# Patient Record
Sex: MALE | Race: WHITE | NOT HISPANIC OR LATINO | Employment: OTHER | URBAN - METROPOLITAN AREA
[De-identification: names, ages, dates, MRNs, and addresses within clinical notes are randomized per-mention and may not be internally consistent; named-entity substitution may affect disease eponyms.]

---

## 2024-11-10 ENCOUNTER — APPOINTMENT (OUTPATIENT)
Dept: RADIOLOGY | Facility: HOSPITAL | Age: 79
DRG: 280 | End: 2024-11-10
Payer: MEDICARE

## 2024-11-10 ENCOUNTER — APPOINTMENT (OUTPATIENT)
Dept: CARDIOLOGY | Facility: HOSPITAL | Age: 79
DRG: 280 | End: 2024-11-10
Payer: MEDICARE

## 2024-11-10 ENCOUNTER — HOSPITAL ENCOUNTER (INPATIENT)
Facility: HOSPITAL | Age: 79
End: 2024-11-10
Attending: STUDENT IN AN ORGANIZED HEALTH CARE EDUCATION/TRAINING PROGRAM | Admitting: STUDENT IN AN ORGANIZED HEALTH CARE EDUCATION/TRAINING PROGRAM
Payer: MEDICARE

## 2024-11-10 VITALS
OXYGEN SATURATION: 95 % | TEMPERATURE: 96.4 F | HEART RATE: 81 BPM | WEIGHT: 155.75 LBS | DIASTOLIC BLOOD PRESSURE: 56 MMHG | SYSTOLIC BLOOD PRESSURE: 102 MMHG | RESPIRATION RATE: 20 BRPM | HEIGHT: 64 IN | BODY MASS INDEX: 26.59 KG/M2

## 2024-11-10 DIAGNOSIS — I50.23 ACUTE ON CHRONIC SYSTOLIC (CONGESTIVE) HEART FAILURE: ICD-10-CM

## 2024-11-10 DIAGNOSIS — I50.43 ACUTE ON CHRONIC COMBINED SYSTOLIC AND DIASTOLIC HEART FAILURE: ICD-10-CM

## 2024-11-10 DIAGNOSIS — R06.02 SOB (SHORTNESS OF BREATH): Primary | ICD-10-CM

## 2024-11-10 PROBLEM — N18.30 CKD STAGE 3 DUE TO TYPE 2 DIABETES MELLITUS (MULTI): Status: ACTIVE | Noted: 2021-01-06

## 2024-11-10 PROBLEM — T82.198A MALFUNCTION OF IMPLANTABLE DEFIBRILLATOR VENTRICULAR (ICD) LEAD: Status: ACTIVE | Noted: 2023-12-20

## 2024-11-10 PROBLEM — N40.0 BENIGN PROSTATIC HYPERPLASIA: Status: ACTIVE | Noted: 2019-08-16

## 2024-11-10 PROBLEM — J06.9 VIRAL UPPER RESPIRATORY TRACT INFECTION: Status: ACTIVE | Noted: 2024-11-10

## 2024-11-10 PROBLEM — R07.9 CHEST PAIN: Status: ACTIVE | Noted: 2019-08-15

## 2024-11-10 PROBLEM — I25.10 CAD (CORONARY ARTERY DISEASE): Status: ACTIVE | Noted: 2019-08-16

## 2024-11-10 PROBLEM — I34.0 NONRHEUMATIC MITRAL VALVE REGURGITATION: Status: ACTIVE | Noted: 2024-11-10

## 2024-11-10 PROBLEM — I50.9 CONGESTIVE HEART FAILURE: Status: ACTIVE | Noted: 2024-11-10

## 2024-11-10 PROBLEM — E11.9 CONTROLLED TYPE 2 DIABETES MELLITUS WITHOUT COMPLICATION, WITHOUT LONG-TERM CURRENT USE OF INSULIN (MULTI): Status: ACTIVE | Noted: 2022-11-02

## 2024-11-10 PROBLEM — E78.2 HYPERLIPEMIA, MIXED: Status: ACTIVE | Noted: 2024-11-10

## 2024-11-10 PROBLEM — E11.9 DIABETES MELLITUS (MULTI): Status: ACTIVE | Noted: 2024-11-10

## 2024-11-10 PROBLEM — I48.91 ATRIAL FIBRILLATION (MULTI): Status: ACTIVE | Noted: 2024-11-10

## 2024-11-10 PROBLEM — M10.9 GOUT: Status: ACTIVE | Noted: 2024-11-10

## 2024-11-10 PROBLEM — I50.42 CHRONIC COMBINED SYSTOLIC (CONGESTIVE) AND DIASTOLIC (CONGESTIVE) HEART FAILURE: Status: ACTIVE | Noted: 2020-10-28

## 2024-11-10 PROBLEM — E11.22 CKD STAGE 3 DUE TO TYPE 2 DIABETES MELLITUS (MULTI): Status: ACTIVE | Noted: 2021-01-06

## 2024-11-10 LAB
ALBUMIN SERPL BCP-MCNC: 4.1 G/DL (ref 3.4–5)
ALP SERPL-CCNC: 68 U/L (ref 33–136)
ALT SERPL W P-5'-P-CCNC: 11 U/L (ref 10–52)
ANION GAP BLDV CALCULATED.4IONS-SCNC: 15 MMOL/L (ref 10–25)
ANION GAP SERPL CALC-SCNC: 14 MMOL/L (ref 10–20)
ANION GAP SERPL CALC-SCNC: 15 MMOL/L (ref 10–20)
APTT PPP: 36 SECONDS (ref 27–38)
AST SERPL W P-5'-P-CCNC: 15 U/L (ref 9–39)
BASE EXCESS BLDV CALC-SCNC: -4.9 MMOL/L (ref -2–3)
BASOPHILS # BLD AUTO: 0.05 X10*3/UL (ref 0–0.1)
BASOPHILS NFR BLD AUTO: 0.5 %
BILIRUB SERPL-MCNC: 1 MG/DL (ref 0–1.2)
BNP SERPL-MCNC: 4296 PG/ML (ref 0–99)
BODY TEMPERATURE: 37 DEGREES CELSIUS
BUN SERPL-MCNC: 28 MG/DL (ref 6–23)
BUN SERPL-MCNC: 30 MG/DL (ref 6–23)
CA-I BLDV-SCNC: 1.16 MMOL/L (ref 1.1–1.33)
CALCIUM SERPL-MCNC: 8.6 MG/DL (ref 8.6–10.3)
CALCIUM SERPL-MCNC: 8.8 MG/DL (ref 8.6–10.3)
CARDIAC TROPONIN I PNL SERPL HS: 27 NG/L (ref 0–20)
CARDIAC TROPONIN I PNL SERPL HS: 29 NG/L (ref 0–20)
CHLORIDE BLDV-SCNC: 109 MMOL/L (ref 98–107)
CHLORIDE SERPL-SCNC: 107 MMOL/L (ref 98–107)
CHLORIDE SERPL-SCNC: 108 MMOL/L (ref 98–107)
CK SERPL-CCNC: 57 U/L (ref 0–325)
CO2 SERPL-SCNC: 21 MMOL/L (ref 21–32)
CO2 SERPL-SCNC: 23 MMOL/L (ref 21–32)
CREAT SERPL-MCNC: 1.4 MG/DL (ref 0.5–1.3)
CREAT SERPL-MCNC: 1.42 MG/DL (ref 0.5–1.3)
EGFRCR SERPLBLD CKD-EPI 2021: 50 ML/MIN/1.73M*2
EGFRCR SERPLBLD CKD-EPI 2021: 51 ML/MIN/1.73M*2
EOSINOPHIL # BLD AUTO: 0.49 X10*3/UL (ref 0–0.4)
EOSINOPHIL NFR BLD AUTO: 5.2 %
ERYTHROCYTE [DISTWIDTH] IN BLOOD BY AUTOMATED COUNT: 13.3 % (ref 11.5–14.5)
FLUAV RNA RESP QL NAA+PROBE: NOT DETECTED
FLUBV RNA RESP QL NAA+PROBE: NOT DETECTED
GLUCOSE BLD MANUAL STRIP-MCNC: 184 MG/DL (ref 74–99)
GLUCOSE BLD MANUAL STRIP-MCNC: 231 MG/DL (ref 74–99)
GLUCOSE BLD MANUAL STRIP-MCNC: 239 MG/DL (ref 74–99)
GLUCOSE BLD MANUAL STRIP-MCNC: 274 MG/DL (ref 74–99)
GLUCOSE BLDV-MCNC: 271 MG/DL (ref 74–99)
GLUCOSE SERPL-MCNC: 247 MG/DL (ref 74–99)
GLUCOSE SERPL-MCNC: 287 MG/DL (ref 74–99)
HCO3 BLDV-SCNC: 20.6 MMOL/L (ref 22–26)
HCT VFR BLD AUTO: 34.8 % (ref 41–52)
HCT VFR BLD EST: 36 % (ref 41–52)
HGB BLD-MCNC: 11.9 G/DL (ref 13.5–17.5)
HGB BLDV-MCNC: 12 G/DL (ref 13.5–17.5)
IMM GRANULOCYTES # BLD AUTO: 0.05 X10*3/UL (ref 0–0.5)
IMM GRANULOCYTES NFR BLD AUTO: 0.5 % (ref 0–0.9)
INHALED O2 CONCENTRATION: 36 %
INR PPP: 2.8 (ref 0.9–1.1)
LACTATE BLDV-SCNC: 3.1 MMOL/L (ref 0.4–2)
LYMPHOCYTES # BLD AUTO: 2.49 X10*3/UL (ref 0.8–3)
LYMPHOCYTES NFR BLD AUTO: 26.4 %
MAGNESIUM SERPL-MCNC: 2.2 MG/DL (ref 1.6–2.4)
MCH RBC QN AUTO: 34.1 PG (ref 26–34)
MCHC RBC AUTO-ENTMCNC: 34.2 G/DL (ref 32–36)
MCV RBC AUTO: 100 FL (ref 80–100)
MONOCYTES # BLD AUTO: 0.58 X10*3/UL (ref 0.05–0.8)
MONOCYTES NFR BLD AUTO: 6.1 %
NEUTROPHILS # BLD AUTO: 5.78 X10*3/UL (ref 1.6–5.5)
NEUTROPHILS NFR BLD AUTO: 61.3 %
NRBC BLD-RTO: 0 /100 WBCS (ref 0–0)
OXYHGB MFR BLDV: 88.6 % (ref 45–75)
PCO2 BLDV: 39 MM HG (ref 41–51)
PH BLDV: 7.33 PH (ref 7.33–7.43)
PLATELET # BLD AUTO: 184 X10*3/UL (ref 150–450)
PO2 BLDV: 61 MM HG (ref 35–45)
POTASSIUM BLDV-SCNC: 5.1 MMOL/L (ref 3.5–5.3)
POTASSIUM SERPL-SCNC: 3.8 MMOL/L (ref 3.5–5.3)
POTASSIUM SERPL-SCNC: 5.1 MMOL/L (ref 3.5–5.3)
PROT SERPL-MCNC: 7.2 G/DL (ref 6.4–8.2)
PROTHROMBIN TIME: 31.7 SECONDS (ref 9.8–12.8)
RBC # BLD AUTO: 3.49 X10*6/UL (ref 4.5–5.9)
RSV RNA RESP QL NAA+PROBE: NOT DETECTED
SAO2 % BLDV: 90 % (ref 45–75)
SARS-COV-2 RNA RESP QL NAA+PROBE: NOT DETECTED
SODIUM BLDV-SCNC: 139 MMOL/L (ref 136–145)
SODIUM SERPL-SCNC: 139 MMOL/L (ref 136–145)
SODIUM SERPL-SCNC: 140 MMOL/L (ref 136–145)
WBC # BLD AUTO: 9.4 X10*3/UL (ref 4.4–11.3)

## 2024-11-10 PROCEDURE — 2500000002 HC RX 250 W HCPCS SELF ADMINISTERED DRUGS (ALT 637 FOR MEDICARE OP, ALT 636 FOR OP/ED)

## 2024-11-10 PROCEDURE — 2500000001 HC RX 250 WO HCPCS SELF ADMINISTERED DRUGS (ALT 637 FOR MEDICARE OP): Performed by: STUDENT IN AN ORGANIZED HEALTH CARE EDUCATION/TRAINING PROGRAM

## 2024-11-10 PROCEDURE — 71045 X-RAY EXAM CHEST 1 VIEW: CPT | Performed by: RADIOLOGY

## 2024-11-10 PROCEDURE — 85610 PROTHROMBIN TIME: CPT | Performed by: NURSE PRACTITIONER

## 2024-11-10 PROCEDURE — 83735 ASSAY OF MAGNESIUM: CPT | Performed by: NURSE PRACTITIONER

## 2024-11-10 PROCEDURE — 99223 1ST HOSP IP/OBS HIGH 75: CPT | Performed by: PSYCHIATRY & NEUROLOGY

## 2024-11-10 PROCEDURE — 83036 HEMOGLOBIN GLYCOSYLATED A1C: CPT | Performed by: PSYCHIATRY & NEUROLOGY

## 2024-11-10 PROCEDURE — 2500000002 HC RX 250 W HCPCS SELF ADMINISTERED DRUGS (ALT 637 FOR MEDICARE OP, ALT 636 FOR OP/ED): Performed by: NURSE PRACTITIONER

## 2024-11-10 PROCEDURE — 96375 TX/PRO/DX INJ NEW DRUG ADDON: CPT

## 2024-11-10 PROCEDURE — 85025 COMPLETE CBC W/AUTO DIFF WBC: CPT | Performed by: NURSE PRACTITIONER

## 2024-11-10 PROCEDURE — 87634 RSV DNA/RNA AMP PROBE: CPT | Performed by: NURSE PRACTITIONER

## 2024-11-10 PROCEDURE — 1200000002 HC GENERAL ROOM WITH TELEMETRY DAILY

## 2024-11-10 PROCEDURE — 87899 AGENT NOS ASSAY W/OPTIC: CPT | Mod: PARLAB | Performed by: STUDENT IN AN ORGANIZED HEALTH CARE EDUCATION/TRAINING PROGRAM

## 2024-11-10 PROCEDURE — 84132 ASSAY OF SERUM POTASSIUM: CPT | Performed by: NURSE PRACTITIONER

## 2024-11-10 PROCEDURE — 80053 COMPREHEN METABOLIC PANEL: CPT | Performed by: NURSE PRACTITIONER

## 2024-11-10 PROCEDURE — 2500000005 HC RX 250 GENERAL PHARMACY W/O HCPCS: Performed by: STUDENT IN AN ORGANIZED HEALTH CARE EDUCATION/TRAINING PROGRAM

## 2024-11-10 PROCEDURE — 2500000002 HC RX 250 W HCPCS SELF ADMINISTERED DRUGS (ALT 637 FOR MEDICARE OP, ALT 636 FOR OP/ED): Performed by: INTERNAL MEDICINE

## 2024-11-10 PROCEDURE — 2500000002 HC RX 250 W HCPCS SELF ADMINISTERED DRUGS (ALT 637 FOR MEDICARE OP, ALT 636 FOR OP/ED): Performed by: STUDENT IN AN ORGANIZED HEALTH CARE EDUCATION/TRAINING PROGRAM

## 2024-11-10 PROCEDURE — 84145 PROCALCITONIN (PCT): CPT | Mod: PARLAB | Performed by: STUDENT IN AN ORGANIZED HEALTH CARE EDUCATION/TRAINING PROGRAM

## 2024-11-10 PROCEDURE — 94640 AIRWAY INHALATION TREATMENT: CPT

## 2024-11-10 PROCEDURE — 87636 SARSCOV2 & INF A&B AMP PRB: CPT | Performed by: NURSE PRACTITIONER

## 2024-11-10 PROCEDURE — 84484 ASSAY OF TROPONIN QUANT: CPT | Performed by: NURSE PRACTITIONER

## 2024-11-10 PROCEDURE — 93005 ELECTROCARDIOGRAM TRACING: CPT

## 2024-11-10 PROCEDURE — 82947 ASSAY GLUCOSE BLOOD QUANT: CPT

## 2024-11-10 PROCEDURE — 36415 COLL VENOUS BLD VENIPUNCTURE: CPT | Performed by: NURSE PRACTITIONER

## 2024-11-10 PROCEDURE — 80048 BASIC METABOLIC PNL TOTAL CA: CPT | Mod: CCI | Performed by: STUDENT IN AN ORGANIZED HEALTH CARE EDUCATION/TRAINING PROGRAM

## 2024-11-10 PROCEDURE — 96374 THER/PROPH/DIAG INJ IV PUSH: CPT

## 2024-11-10 PROCEDURE — 82550 ASSAY OF CK (CPK): CPT | Performed by: PSYCHIATRY & NEUROLOGY

## 2024-11-10 PROCEDURE — 83519 RIA NONANTIBODY: CPT | Performed by: PSYCHIATRY & NEUROLOGY

## 2024-11-10 PROCEDURE — 99223 1ST HOSP IP/OBS HIGH 75: CPT | Performed by: STUDENT IN AN ORGANIZED HEALTH CARE EDUCATION/TRAINING PROGRAM

## 2024-11-10 PROCEDURE — 87449 NOS EACH ORGANISM AG IA: CPT | Mod: PARLAB | Performed by: STUDENT IN AN ORGANIZED HEALTH CARE EDUCATION/TRAINING PROGRAM

## 2024-11-10 PROCEDURE — 70450 CT HEAD/BRAIN W/O DYE: CPT | Performed by: RADIOLOGY

## 2024-11-10 PROCEDURE — 83880 ASSAY OF NATRIURETIC PEPTIDE: CPT | Performed by: NURSE PRACTITIONER

## 2024-11-10 PROCEDURE — 2500000004 HC RX 250 GENERAL PHARMACY W/ HCPCS (ALT 636 FOR OP/ED): Performed by: NURSE PRACTITIONER

## 2024-11-10 PROCEDURE — 86738 MYCOPLASMA ANTIBODY: CPT | Performed by: STUDENT IN AN ORGANIZED HEALTH CARE EDUCATION/TRAINING PROGRAM

## 2024-11-10 PROCEDURE — 2500000004 HC RX 250 GENERAL PHARMACY W/ HCPCS (ALT 636 FOR OP/ED): Performed by: STUDENT IN AN ORGANIZED HEALTH CARE EDUCATION/TRAINING PROGRAM

## 2024-11-10 PROCEDURE — 36415 COLL VENOUS BLD VENIPUNCTURE: CPT | Performed by: STUDENT IN AN ORGANIZED HEALTH CARE EDUCATION/TRAINING PROGRAM

## 2024-11-10 PROCEDURE — 99285 EMERGENCY DEPT VISIT HI MDM: CPT | Mod: 25

## 2024-11-10 PROCEDURE — 71045 X-RAY EXAM CHEST 1 VIEW: CPT

## 2024-11-10 PROCEDURE — 70450 CT HEAD/BRAIN W/O DYE: CPT

## 2024-11-10 RX ORDER — OMEPRAZOLE 20 MG/1
20 CAPSULE, DELAYED RELEASE ORAL
Status: ON HOLD | COMMUNITY

## 2024-11-10 RX ORDER — TORSEMIDE 5 MG/1
5 TABLET ORAL DAILY
Status: ON HOLD | COMMUNITY

## 2024-11-10 RX ORDER — CARVEDILOL 6.25 MG/1
6.25 TABLET ORAL 2 TIMES DAILY
Status: ON HOLD | COMMUNITY

## 2024-11-10 RX ORDER — CEFTRIAXONE 1 G/50ML
1 INJECTION, SOLUTION INTRAVENOUS EVERY 24 HOURS
Status: DISPENSED | OUTPATIENT
Start: 2024-11-10

## 2024-11-10 RX ORDER — ASPIRIN 81 MG/1
81 TABLET ORAL 3 TIMES WEEKLY
Status: ACTIVE | OUTPATIENT
Start: 2024-11-11

## 2024-11-10 RX ORDER — LEVETIRACETAM 500 MG/1
750 TABLET ORAL NIGHTLY
Status: DISPENSED | OUTPATIENT
Start: 2024-11-10

## 2024-11-10 RX ORDER — WARFARIN 7.5 MG/1
3.75 TABLET ORAL
Status: ACTIVE | OUTPATIENT
Start: 2024-11-11

## 2024-11-10 RX ORDER — LEVETIRACETAM 500 MG/1
500 TABLET ORAL EVERY MORNING
Status: ON HOLD | COMMUNITY

## 2024-11-10 RX ORDER — POTASSIUM CHLORIDE 20 MEQ/1
20 TABLET, EXTENDED RELEASE ORAL ONCE
Status: COMPLETED | OUTPATIENT
Start: 2024-11-10 | End: 2024-11-10

## 2024-11-10 RX ORDER — EPLERENONE 25 MG/1
25 TABLET, FILM COATED ORAL DAILY
Status: DISPENSED | OUTPATIENT
Start: 2024-11-10

## 2024-11-10 RX ORDER — LEVETIRACETAM 500 MG/1
500 TABLET ORAL EVERY MORNING
Status: DISPENSED | OUTPATIENT
Start: 2024-11-10

## 2024-11-10 RX ORDER — ACETAMINOPHEN 325 MG/1
650 TABLET ORAL EVERY 6 HOURS PRN
Status: ACTIVE | OUTPATIENT
Start: 2024-11-10

## 2024-11-10 RX ORDER — EPLERENONE 25 MG/1
25 TABLET, FILM COATED ORAL DAILY
Status: ON HOLD | COMMUNITY

## 2024-11-10 RX ORDER — WARFARIN 2.5 MG/1
2.5 TABLET ORAL
Status: DISPENSED | OUTPATIENT
Start: 2024-11-10

## 2024-11-10 RX ORDER — SIMVASTATIN 40 MG/1
40 TABLET, FILM COATED ORAL NIGHTLY
Status: ON HOLD | COMMUNITY

## 2024-11-10 RX ORDER — FINASTERIDE 5 MG/1
5 TABLET, FILM COATED ORAL NIGHTLY
Status: ON HOLD | COMMUNITY

## 2024-11-10 RX ORDER — IPRATROPIUM BROMIDE AND ALBUTEROL SULFATE 2.5; .5 MG/3ML; MG/3ML
3 SOLUTION RESPIRATORY (INHALATION) EVERY 2 HOUR PRN
Status: ACTIVE | OUTPATIENT
Start: 2024-11-10

## 2024-11-10 RX ORDER — LEVETIRACETAM 750 MG/1
750 TABLET ORAL NIGHTLY
Status: ON HOLD | COMMUNITY

## 2024-11-10 RX ORDER — DULAGLUTIDE 1.5 MG/.5ML
1.5 INJECTION, SOLUTION SUBCUTANEOUS WEEKLY
Status: ON HOLD | COMMUNITY

## 2024-11-10 RX ORDER — ASPIRIN 81 MG/1
81 TABLET ORAL 3 TIMES WEEKLY
Status: ON HOLD | COMMUNITY

## 2024-11-10 RX ORDER — FINASTERIDE 5 MG/1
5 TABLET, FILM COATED ORAL NIGHTLY
Status: DISPENSED | OUTPATIENT
Start: 2024-11-10

## 2024-11-10 RX ORDER — PANTOPRAZOLE SODIUM 40 MG/1
40 TABLET, DELAYED RELEASE ORAL DAILY
Status: DISPENSED | OUTPATIENT
Start: 2024-11-10

## 2024-11-10 RX ORDER — FUROSEMIDE 10 MG/ML
20 INJECTION INTRAMUSCULAR; INTRAVENOUS EVERY 12 HOURS
Status: DISPENSED | OUTPATIENT
Start: 2024-11-10

## 2024-11-10 RX ORDER — POLYETHYLENE GLYCOL 3350 17 G/17G
17 POWDER, FOR SOLUTION ORAL DAILY PRN
Status: ACTIVE | OUTPATIENT
Start: 2024-11-10

## 2024-11-10 RX ORDER — FUROSEMIDE 10 MG/ML
40 INJECTION INTRAMUSCULAR; INTRAVENOUS ONCE
Status: COMPLETED | OUTPATIENT
Start: 2024-11-10 | End: 2024-11-10

## 2024-11-10 RX ORDER — DEXTROMETHORPHAN HYDROBROMIDE, GUAIFENESIN 5; 100 MG/5ML; MG/5ML
650 LIQUID ORAL EVERY 4 HOURS PRN
Status: ON HOLD | COMMUNITY

## 2024-11-10 RX ORDER — ALBUTEROL SULFATE 0.83 MG/ML
2.5 SOLUTION RESPIRATORY (INHALATION) ONCE
Status: COMPLETED | OUTPATIENT
Start: 2024-11-10 | End: 2024-11-10

## 2024-11-10 RX ORDER — SIMVASTATIN 20 MG/1
40 TABLET, FILM COATED ORAL NIGHTLY
Status: DISPENSED | OUTPATIENT
Start: 2024-11-10

## 2024-11-10 RX ORDER — DIGOXIN 125 MCG
125 TABLET ORAL
Status: DISPENSED | OUTPATIENT
Start: 2024-11-10

## 2024-11-10 RX ORDER — SACUBITRIL AND VALSARTAN 24; 26 MG/1; MG/1
1 TABLET, FILM COATED ORAL 2 TIMES DAILY
Status: ON HOLD | COMMUNITY

## 2024-11-10 RX ORDER — DIGOXIN 125 MCG
125 TABLET ORAL
Status: ON HOLD | COMMUNITY

## 2024-11-10 RX ORDER — WARFARIN 2.5 MG/1
2.5 TABLET ORAL
Status: DISCONTINUED | OUTPATIENT
Start: 2024-11-11 | End: 2024-11-10

## 2024-11-10 RX ORDER — WARFARIN 7.5 MG/1
3.75 TABLET ORAL
Status: DISCONTINUED | OUTPATIENT
Start: 2024-11-10 | End: 2024-11-10

## 2024-11-10 RX ORDER — IPRATROPIUM BROMIDE AND ALBUTEROL SULFATE 2.5; .5 MG/3ML; MG/3ML
3 SOLUTION RESPIRATORY (INHALATION) ONCE
Status: COMPLETED | OUTPATIENT
Start: 2024-11-10 | End: 2024-11-10

## 2024-11-10 RX ORDER — CARVEDILOL 3.12 MG/1
6.25 TABLET ORAL 2 TIMES DAILY
Status: DISPENSED | OUTPATIENT
Start: 2024-11-10

## 2024-11-10 RX ORDER — INSULIN LISPRO 100 [IU]/ML
0-10 INJECTION, SOLUTION INTRAVENOUS; SUBCUTANEOUS
Status: DISPENSED | OUTPATIENT
Start: 2024-11-10

## 2024-11-10 RX ORDER — WARFARIN 2.5 MG/1
2.5 TABLET ORAL DAILY
Status: ON HOLD | COMMUNITY

## 2024-11-10 RX ADMIN — DIGOXIN 125 MCG: 125 TABLET ORAL at 09:47

## 2024-11-10 RX ADMIN — LEVETIRACETAM 750 MG: 500 TABLET, FILM COATED ORAL at 22:26

## 2024-11-10 RX ADMIN — POTASSIUM CHLORIDE 20 MEQ: 1500 TABLET, EXTENDED RELEASE ORAL at 12:56

## 2024-11-10 RX ADMIN — Medication 2 L/MIN: at 07:42

## 2024-11-10 RX ADMIN — ALBUTEROL SULFATE 2.5 MG: 2.5 SOLUTION RESPIRATORY (INHALATION) at 01:07

## 2024-11-10 RX ADMIN — FUROSEMIDE 40 MG: 10 INJECTION, SOLUTION INTRAMUSCULAR; INTRAVENOUS at 02:13

## 2024-11-10 RX ADMIN — INSULIN LISPRO 2 UNITS: 100 INJECTION, SOLUTION INTRAVENOUS; SUBCUTANEOUS at 17:50

## 2024-11-10 RX ADMIN — PANTOPRAZOLE SODIUM 40 MG: 40 TABLET, DELAYED RELEASE ORAL at 09:49

## 2024-11-10 RX ADMIN — FINASTERIDE 5 MG: 5 TABLET, FILM COATED ORAL at 22:26

## 2024-11-10 RX ADMIN — SIMVASTATIN 40 MG: 20 TABLET, FILM COATED ORAL at 22:26

## 2024-11-10 RX ADMIN — CARVEDILOL 6.25 MG: 3.12 TABLET, FILM COATED ORAL at 09:48

## 2024-11-10 RX ADMIN — LEVETIRACETAM 500 MG: 500 TABLET, FILM COATED ORAL at 09:47

## 2024-11-10 RX ADMIN — METHYLPREDNISOLONE SODIUM SUCCINATE 125 MG: 125 INJECTION, POWDER, FOR SOLUTION INTRAMUSCULAR; INTRAVENOUS at 01:20

## 2024-11-10 RX ADMIN — INSULIN LISPRO 6 UNITS: 100 INJECTION, SOLUTION INTRAVENOUS; SUBCUTANEOUS at 12:56

## 2024-11-10 RX ADMIN — EPLERENONE 25 MG: 25 TABLET, FILM COATED ORAL at 09:48

## 2024-11-10 RX ADMIN — SACUBITRIL AND VALSARTAN 1 TABLET: 24; 26 TABLET, FILM COATED ORAL at 22:26

## 2024-11-10 RX ADMIN — FUROSEMIDE 20 MG: 10 INJECTION, SOLUTION INTRAMUSCULAR; INTRAVENOUS at 09:47

## 2024-11-10 RX ADMIN — DOXYCYCLINE 100 MG: 100 INJECTION, POWDER, LYOPHILIZED, FOR SOLUTION INTRAVENOUS at 06:29

## 2024-11-10 RX ADMIN — WARFARIN SODIUM 2.5 MG: 2.5 TABLET ORAL at 18:03

## 2024-11-10 RX ADMIN — FUROSEMIDE 20 MG: 10 INJECTION, SOLUTION INTRAMUSCULAR; INTRAVENOUS at 22:26

## 2024-11-10 RX ADMIN — SACUBITRIL AND VALSARTAN 1 TABLET: 24; 26 TABLET, FILM COATED ORAL at 09:47

## 2024-11-10 RX ADMIN — CARVEDILOL 6.25 MG: 3.12 TABLET, FILM COATED ORAL at 22:26

## 2024-11-10 RX ADMIN — CEFTRIAXONE SODIUM 1 G: 1 INJECTION, SOLUTION INTRAVENOUS at 07:57

## 2024-11-10 RX ADMIN — DOXYCYCLINE 100 MG: 100 INJECTION, POWDER, LYOPHILIZED, FOR SOLUTION INTRAVENOUS at 17:59

## 2024-11-10 RX ADMIN — INSULIN LISPRO 4 UNITS: 100 INJECTION, SOLUTION INTRAVENOUS; SUBCUTANEOUS at 07:55

## 2024-11-10 RX ADMIN — Medication 2 L/MIN: at 22:28

## 2024-11-10 RX ADMIN — IPRATROPIUM BROMIDE AND ALBUTEROL SULFATE 3 ML: .5; 3 SOLUTION RESPIRATORY (INHALATION) at 01:07

## 2024-11-10 SDOH — ECONOMIC STABILITY: FOOD INSECURITY: HOW HARD IS IT FOR YOU TO PAY FOR THE VERY BASICS LIKE FOOD, HOUSING, MEDICAL CARE, AND HEATING?: NOT VERY HARD

## 2024-11-10 SDOH — ECONOMIC STABILITY: HOUSING INSECURITY: IN THE LAST 12 MONTHS, WAS THERE A TIME WHEN YOU WERE NOT ABLE TO PAY THE MORTGAGE OR RENT ON TIME?: NO

## 2024-11-10 SDOH — SOCIAL STABILITY: SOCIAL INSECURITY
WITHIN THE LAST YEAR, HAVE YOU BEEN KICKED, HIT, SLAPPED, OR OTHERWISE PHYSICALLY HURT BY YOUR PARTNER OR EX-PARTNER?: NO

## 2024-11-10 SDOH — ECONOMIC STABILITY: FOOD INSECURITY: WITHIN THE PAST 12 MONTHS, YOU WORRIED THAT YOUR FOOD WOULD RUN OUT BEFORE YOU GOT THE MONEY TO BUY MORE.: NEVER TRUE

## 2024-11-10 SDOH — SOCIAL STABILITY: SOCIAL INSECURITY: WITHIN THE LAST YEAR, HAVE YOU BEEN HUMILIATED OR EMOTIONALLY ABUSED IN OTHER WAYS BY YOUR PARTNER OR EX-PARTNER?: NO

## 2024-11-10 SDOH — SOCIAL STABILITY: SOCIAL INSECURITY: WITHIN THE LAST YEAR, HAVE YOU BEEN AFRAID OF YOUR PARTNER OR EX-PARTNER?: NO

## 2024-11-10 SDOH — SOCIAL STABILITY: SOCIAL INSECURITY: DO YOU FEEL ANYONE HAS EXPLOITED OR TAKEN ADVANTAGE OF YOU FINANCIALLY OR OF YOUR PERSONAL PROPERTY?: NO

## 2024-11-10 SDOH — ECONOMIC STABILITY: HOUSING INSECURITY: IN THE PAST 12 MONTHS, HOW MANY TIMES HAVE YOU MOVED WHERE YOU WERE LIVING?: 0

## 2024-11-10 SDOH — SOCIAL STABILITY: SOCIAL INSECURITY
WITHIN THE LAST YEAR, HAVE YOU BEEN RAPED OR FORCED TO HAVE ANY KIND OF SEXUAL ACTIVITY BY YOUR PARTNER OR EX-PARTNER?: NO

## 2024-11-10 SDOH — SOCIAL STABILITY: SOCIAL INSECURITY: DOES ANYONE TRY TO KEEP YOU FROM HAVING/CONTACTING OTHER FRIENDS OR DOING THINGS OUTSIDE YOUR HOME?: NO

## 2024-11-10 SDOH — ECONOMIC STABILITY: FOOD INSECURITY: WITHIN THE PAST 12 MONTHS, THE FOOD YOU BOUGHT JUST DIDN'T LAST AND YOU DIDN'T HAVE MONEY TO GET MORE.: NEVER TRUE

## 2024-11-10 SDOH — SOCIAL STABILITY: SOCIAL INSECURITY: DO YOU FEEL UNSAFE GOING BACK TO THE PLACE WHERE YOU ARE LIVING?: NO

## 2024-11-10 SDOH — SOCIAL STABILITY: SOCIAL INSECURITY: ABUSE: ADULT

## 2024-11-10 SDOH — ECONOMIC STABILITY: TRANSPORTATION INSECURITY: IN THE PAST 12 MONTHS, HAS LACK OF TRANSPORTATION KEPT YOU FROM MEDICAL APPOINTMENTS OR FROM GETTING MEDICATIONS?: NO

## 2024-11-10 SDOH — SOCIAL STABILITY: SOCIAL INSECURITY: ARE YOU OR HAVE YOU BEEN THREATENED OR ABUSED PHYSICALLY, EMOTIONALLY, OR SEXUALLY BY ANYONE?: NO

## 2024-11-10 SDOH — SOCIAL STABILITY: SOCIAL INSECURITY: HAVE YOU HAD ANY THOUGHTS OF HARMING ANYONE ELSE?: NO

## 2024-11-10 SDOH — SOCIAL STABILITY: SOCIAL INSECURITY: HAS ANYONE EVER THREATENED TO HURT YOUR FAMILY OR YOUR PETS?: NO

## 2024-11-10 SDOH — ECONOMIC STABILITY: HOUSING INSECURITY: AT ANY TIME IN THE PAST 12 MONTHS, WERE YOU HOMELESS OR LIVING IN A SHELTER (INCLUDING NOW)?: NO

## 2024-11-10 SDOH — SOCIAL STABILITY: SOCIAL INSECURITY: ARE THERE ANY APPARENT SIGNS OF INJURIES/BEHAVIORS THAT COULD BE RELATED TO ABUSE/NEGLECT?: NO

## 2024-11-10 SDOH — ECONOMIC STABILITY: INCOME INSECURITY: IN THE PAST 12 MONTHS HAS THE ELECTRIC, GAS, OIL, OR WATER COMPANY THREATENED TO SHUT OFF SERVICES IN YOUR HOME?: NO

## 2024-11-10 SDOH — SOCIAL STABILITY: SOCIAL INSECURITY: HAVE YOU HAD THOUGHTS OF HARMING ANYONE ELSE?: NO

## 2024-11-10 ASSESSMENT — ACTIVITIES OF DAILY LIVING (ADL)
FEEDING YOURSELF: INDEPENDENT
LACK_OF_TRANSPORTATION: NO
JUDGMENT_ADEQUATE_SAFELY_COMPLETE_DAILY_ACTIVITIES: YES
HEARING - LEFT EAR: FUNCTIONAL
BATHING: NEEDS ASSISTANCE
GROOMING: INDEPENDENT
ADEQUATE_TO_COMPLETE_ADL: YES
HEARING - RIGHT EAR: FUNCTIONAL
PATIENT'S MEMORY ADEQUATE TO SAFELY COMPLETE DAILY ACTIVITIES?: YES
TOILETING: NEEDS ASSISTANCE
DRESSING YOURSELF: INDEPENDENT
WALKS IN HOME: INDEPENDENT
LACK_OF_TRANSPORTATION: NO

## 2024-11-10 ASSESSMENT — COGNITIVE AND FUNCTIONAL STATUS - GENERAL
HELP NEEDED FOR BATHING: A LITTLE
CLIMB 3 TO 5 STEPS WITH RAILING: A LITTLE
MOBILITY SCORE: 24
MOVING TO AND FROM BED TO CHAIR: A LITTLE
STANDING UP FROM CHAIR USING ARMS: A LITTLE
DAILY ACTIVITIY SCORE: 20
DRESSING REGULAR LOWER BODY CLOTHING: A LITTLE
MOBILITY SCORE: 22
PATIENT BASELINE BEDBOUND: NO
WALKING IN HOSPITAL ROOM: A LITTLE
TOILETING: A LITTLE
MOBILITY SCORE: 20
TOILETING: A LITTLE
HELP NEEDED FOR BATHING: A LITTLE
PERSONAL GROOMING: A LITTLE
CLIMB 3 TO 5 STEPS WITH RAILING: A LITTLE
DAILY ACTIVITIY SCORE: 24
DAILY ACTIVITIY SCORE: 22
WALKING IN HOSPITAL ROOM: A LITTLE

## 2024-11-10 ASSESSMENT — LIFESTYLE VARIABLES
HOW OFTEN DO YOU HAVE 6 OR MORE DRINKS ON ONE OCCASION: NEVER
AUDIT-C TOTAL SCORE: 0
EVER FELT BAD OR GUILTY ABOUT YOUR DRINKING: NO
SKIP TO QUESTIONS 9-10: 1
TOTAL SCORE: 0
HAVE PEOPLE ANNOYED YOU BY CRITICIZING YOUR DRINKING: NO
EVER HAD A DRINK FIRST THING IN THE MORNING TO STEADY YOUR NERVES TO GET RID OF A HANGOVER: NO
AUDIT-C TOTAL SCORE: 0
HAVE YOU EVER FELT YOU SHOULD CUT DOWN ON YOUR DRINKING: NO
HOW OFTEN DO YOU HAVE A DRINK CONTAINING ALCOHOL: NEVER
HOW MANY STANDARD DRINKS CONTAINING ALCOHOL DO YOU HAVE ON A TYPICAL DAY: PATIENT DOES NOT DRINK

## 2024-11-10 ASSESSMENT — ENCOUNTER SYMPTOMS
SHORTNESS OF BREATH: 1
COUGH: 1

## 2024-11-10 ASSESSMENT — PAIN SCALES - GENERAL
PAINLEVEL_OUTOF10: 0 - NO PAIN

## 2024-11-10 ASSESSMENT — PAIN DESCRIPTION - PROGRESSION: CLINICAL_PROGRESSION: GRADUALLY IMPROVING

## 2024-11-10 ASSESSMENT — PAIN - FUNCTIONAL ASSESSMENT: PAIN_FUNCTIONAL_ASSESSMENT: 0-10

## 2024-11-10 ASSESSMENT — PATIENT HEALTH QUESTIONNAIRE - PHQ9
SUM OF ALL RESPONSES TO PHQ9 QUESTIONS 1 & 2: 0
2. FEELING DOWN, DEPRESSED OR HOPELESS: NOT AT ALL
1. LITTLE INTEREST OR PLEASURE IN DOING THINGS: NOT AT ALL

## 2024-11-10 NOTE — H&P
Subjective   Ernst Clark is a 79 y.o. male who presents for No chief complaint on file.    HPI  This is an 79-year-old male who initially presented to [facility] on 11/10/2024 with sudden onset SOB beginning at approximately 10:30PM the night prior.  He reports sitting in the family room when he suddenly became SOB.  This was further exacerbated with physical activity as he walked to the bedroom and again when he attempted to lie down in bed.  He reports having dry cough with postnasal drip since 10/7 after getting his flu vaccine.  He otherwise denies any fever, chills, chest pain, or palpitations.    He was found to be hypoxic on RA after which he was placed on 4L NC.  Workup thus far notable for SCr 1.40, which is approximately his baseline.  BNP 4296.  Troponins mildly elevated from 27 > 29.  EKG shows v-paced rhythm with PVCs.  CXR shows pulmonary vascular congestion with left basilar opacity.  COVID and influenza PCR negative.    PMH:  Dilated cardiomyopathy with EF 23% as of 9/2020 per outpatient notes s/p CRT-D in 2005  SSS s/p PPM in 1998  Stage 1 NSCLC of RUL (dx 12/2022) s/p radiation  Paroxsymal afib, on Coumadin  CKD IIIa  Seizure disorder  DM2  Hypertension  Hyperlipidemia  Hx pancreatitis following cholecystectomy  Hx perforated diverticulitis s/p partial colectomy with anastomosis f/b colostomy reversal    PSH: CRT-D, appendectomy, cholecystectomy, partial colectomy with anastomosis f/b colostomy reversal hernia repair, bilateral cataract extraction  FH: DM, Alzheimer's, CHF  SH: Former smoker with 12-pack-year hx, quit in 1968. Denies alcohol or illicit drug use. , lives at home with wife. Ambulates independently.    Review of Systems:  12-point ROS was reviewed and is negative, unless otherwise noted in HPI    Objective   Vitals:    11/10/24 0400   BP: 127/71   Pulse: 96   Resp: 20   Temp:    SpO2: 97%       Physical Exam:   Constitutional: well developed, awake, alert, no acute distress  ENMT:  mucous membranes moist, conjunctivae clear  Head/Neck: normocephalic, atraumatic; supple, trachea midline  Respiratory/Thorax: diminished breath sounds, mild scattered wheezes, bibasilar crackles  Cardiovascular: RRR, no murmur appreciated  Gastrointestinal: soft, nondistended, non-tender, bowel sounds appreciated  Extremities: palpable peripheral pulses, no edema  Neurological: AO x3, no focal deficits  Psychological: appropriate mood and behavior  Skin: warm and dry    Scheduled Medications:   [START ON 11/11/2024] aspirin, 81 mg, oral, Once per day on Monday Wednesday Friday  carvedilol, 6.25 mg, oral, BID  cefTRIAXone, 1 g, intravenous, q24h  digoxin, 125 mcg, oral, Once per day on Sunday Monday Tuesday Wednesday Thursday Saturday  doxycycline, 100 mg, intravenous, q12h  finasteride, 5 mg, oral, Nightly  furosemide, 20 mg, intravenous, q12h  insulin lispro, 0-10 Units, subcutaneous, TID  levETIRAcetam, 500 mg, oral, q AM  levETIRAcetam, 750 mg, oral, Nightly  oxygen, , inhalation, Continuous - Inhalation  pantoprazole, 40 mg, oral, Daily  sacubitriL-valsartan, 1 tablet, oral, BID  simvastatin, 40 mg, oral, Nightly  [START ON 11/11/2024] warfarin, 2.5 mg, oral, Once per day on Monday Tuesday Thursday Friday Saturday  warfarin, 3.75 mg, oral, Once per day on Sunday Wednesday       Continuous Medications:       PRN Medications:   PRN medications: acetaminophen, ipratropium-albuteroL, polyethylene glycol    Assessment/Plan     Acute on chronic HFrEF with EF 23% as of 9/2020 per outpatient notes s/p CRT-D in 2005  NICM  Community-acquired PNA  Acute hypoxic respiratory failure  Elevated troponin, likely demand ischemia in setting of above  Immunocompromised status    SSS s/p PPM in 1998  Stage 1 NSCLC of RUL (dx 12/2022) s/p radiation  Paroxsymal afib, on Coumadin  CKD IIIa  Seizure disorder  DM2  Hypertension  Hyperlipidemia  Hx pancreatitis following cholecystectomy  Hx perforated diverticulitis s/p partial  colectomy with anastomosis f/b colostomy reversal    S/p Lasix 40mg IVP in the ED. Will continue with Lasix 20mg BID IV. Strict I&Os. Check TTE, consult cardiology. Continue home Coreg, Entresto, Eplerenone.  There is question of L basilar opacity. Given ongoing dry cough with SOB, will empirically cover with Rocephin and Doxycycline. Deescalate as able pending UAg, procal.  Currently on 4L NC. Not on O2 at baseline. Wean as able with goal SpO2>92%.    DVT PPX: Coumadin  Code status: DNR-CCA - Confirmed with pt.    Akilah Valdes, [facility]

## 2024-11-10 NOTE — CONSULTS
Inpatient consult to Neurology  Consult performed by: Lela Simons DO  Consult ordered by: Joseph Vargas DO          History Of Present Illness  Ernst Clark is a 79 y.o. male with complicated past medical history whom presented with shortness of breath and while in the hospital developed double vision.  Patient reports double vision mostly with looking in the distance and it resolves with closing one eye. Symptoms are coming and going.  Has had a few episodes of similar symptoms in the past while driving - those were more transient then this current episode.   Symptoms might have started in the ED with albuterol however thinks they were before he received solumedrol.  Denies any other focal neurologic deficits associated. Right eye is slightly droopy on exam, patient has not noticed it.  His wife believes it looks a little different than normal.    Past Medical History  Past Medical History:   Diagnosis Date    Personal history of other diseases of the circulatory system 05/05/2015    History of ventricular tachycardia    Personal history of other diseases of the digestive system     History of hemorrhoids    Personal history of other infectious and parasitic diseases     History of chickenpox    Personal history of other infectious and parasitic diseases     History of measles    Personal history of other infectious and parasitic diseases     History of mumps    Personal history of pneumonia (recurrent)     History of pneumonia    Personal history of transient ischemic attack (TIA), and cerebral infarction without residual deficits     History of stroke     Surgical History  Past Surgical History:   Procedure Laterality Date    CARDIAC PACEMAKER PLACEMENT  12/21/2017    Pacemaker Placement    CHOLECYSTECTOMY  12/21/2017    Cholecystectomy Laparoscopic    COLECTOMY  12/21/2017    Partial Colectomy     Social History  Social History     Tobacco Use    Smoking status: Never    Smokeless tobacco: Never   Vaping Use     Vaping status: Never Used     Allergies  Morphine, Adalat, and Empagliflozin  Medications Prior to Admission   Medication Sig Dispense Refill Last Dose/Taking    aspirin 81 mg EC tablet Take 1 tablet (81 mg) by mouth 3 times a week. MWF   Past Week    carvedilol (Coreg) 6.25 mg tablet Take 1 tablet (6.25 mg) by mouth 2 times a day.   11/9/2024    digoxin (Lanoxin) 125 MCG tablet Take 1 tablet (125 mcg) by mouth 6 times a week. Skip Friday 11/9/2024    dulaglutide (Trulicity) 1.5 mg/0.5 mL pen injector injection Inject 1.5 mg under the skin 1 (one) time per week. Every Saturday AM   11/9/2024    eplerenone (Inspra) 25 mg tablet Take 1 tablet (25 mg) by mouth once daily.   11/9/2024    finasteride (Proscar) 5 mg tablet Take 1 tablet (5 mg) by mouth once daily at bedtime. Do not crush, chew, or split.   11/9/2024    levETIRAcetam (Keppra) 500 mg tablet Take 1 tablet (500 mg) by mouth once daily in the morning.   11/9/2024    levETIRAcetam (Keppra) 750 mg tablet Take 1 tablet (750 mg) by mouth once daily at bedtime.   11/9/2024    omeprazole (PriLOSEC) 20 mg DR capsule Take 1 capsule (20 mg) by mouth once daily in the morning. Take before meals. Do not crush or chew.   11/9/2024    sacubitriL-valsartan (Entresto) 24-26 mg tablet Take 1 tablet by mouth 2 times a day.   11/9/2024    simvastatin (Zocor) 40 mg tablet Take 1 tablet (40 mg) by mouth once daily at bedtime.   11/9/2024    torsemide (Demadex) 5 mg tablet Take 1 tablet (5 mg) by mouth once daily.   11/9/2024    warfarin (Coumadin) 2.5 mg tablet Take 1 tablet (2.5 mg) by mouth once daily. 2.5mg daily, 3.75mg on Sunday and Wednesday 11/9/2024    acetaminophen (Tylenol 8 HOUR) 650 mg ER tablet Take 1 tablet (650 mg) by mouth every 4 hours if needed for mild pain (1 - 3). Do not crush, chew, or split. (Patient not taking: Reported on 11/10/2024)   More than a month       Review of Systems   Eyes:  Positive for visual disturbance.   Respiratory:  Positive for  cough and shortness of breath.    All other systems reviewed and are negative.    Neurological Exam  Physical Exam  On general examination, the patient is well appearing and well groomed. Heart is regular, rate and rhythm. Lungs are clear to ausculation bilaterally. There is no peripheral edema. Normal pedal pulses bilaterally. No carotid bruits.   On neurologic examination, the history is related in quite good detail with no obvious deficit of attention, memory or language. Fund of knowledge is adequate. Orientation is intact to person, place and time. Spontaneous speech is fluent with no paraphasic or aphasic errors. On cranial nerve exam, the pupils are equal, round and reactive to light. Extraocular movements are full with no obvious deficit.  He reports double vision at primary gaze about 6 feet away, none upclose.  Double vision worse when looking to the left.  No initial double vision with upgaze however noticed with sustained upgaze. Mild right eye ptosis, not obviously fatiguable. Visual fields are full to confrontation.  There is no bulbofacial weakness or ptosis. There is no ptosis with sustained upgaze. The tongue is midline with no wasting or fasciculations. The palate elevates symmetrically. Facial sensation is intact to light touch and pinprick bilaterally. Hearing is intact to finger rub bilaterally. Shoulder shrug is 5/5 bilaterally.  Neck flexion and extension have full strength. Motor examination reveals normal tone and bulk in the upper and lower extremities bilaterally. There are no fasciculations. There is full strength in the proximal and distal muscles of the upper and lower extremities bilaterally. Deep tendon reflexes are 2/4 and symmetric throughout the upper and lower extremities bilaterally, including the ankle jerks. Plantar responses are flexor bilaterally. Fine finger movements and rapid alternating movements are done well in both hands. There is no tremor. On coordination testing,  "finger-nose-finger testing are done well bilaterally. Sensory examination reveals normal light touch sensation in the distal upper and lower extremities bilaterally. Gait is normal.    Last Recorded Vitals  Blood pressure 105/58, pulse 80, temperature 35.9 °C (96.6 °F), resp. rate 20, height 1.626 m (5' 4\"), weight 70.7 kg (155 lb 12.1 oz), SpO2 94%.    Relevant Results  Scheduled medications  [START ON 11/11/2024] aspirin, 81 mg, oral, Once per day on Monday Wednesday Friday  carvedilol, 6.25 mg, oral, BID  cefTRIAXone, 1 g, intravenous, q24h  digoxin, 125 mcg, oral, Once per day on Sunday Monday Tuesday Wednesday Thursday Saturday  doxycycline, 100 mg, intravenous, q12h  eplerenone, 25 mg, oral, Daily  finasteride, 5 mg, oral, Nightly  furosemide, 20 mg, intravenous, q12h  insulin lispro, 0-10 Units, subcutaneous, TID  levETIRAcetam, 500 mg, oral, q AM  levETIRAcetam, 750 mg, oral, Nightly  oxygen, , inhalation, Continuous - Inhalation  pantoprazole, 40 mg, oral, Daily  sacubitriL-valsartan, 1 tablet, oral, BID  simvastatin, 40 mg, oral, Nightly  warfarin, 2.5 mg, oral, Once per day on Sunday Tuesday Thursday Saturday  [START ON 11/11/2024] warfarin, 3.75 mg, oral, Once per day on Monday Wednesday Friday      Continuous medications     PRN medications  PRN medications: acetaminophen, ipratropium-albuteroL, polyethylene glycol    Scheduled medications  [START ON 11/11/2024] aspirin, 81 mg, oral, Once per day on Monday Wednesday Friday  carvedilol, 6.25 mg, oral, BID  cefTRIAXone, 1 g, intravenous, q24h  digoxin, 125 mcg, oral, Once per day on Sunday Monday Tuesday Wednesday Thursday Saturday  doxycycline, 100 mg, intravenous, q12h  eplerenone, 25 mg, oral, Daily  finasteride, 5 mg, oral, Nightly  furosemide, 20 mg, intravenous, q12h  insulin lispro, 0-10 Units, subcutaneous, TID  levETIRAcetam, 500 mg, oral, q AM  levETIRAcetam, 750 mg, oral, Nightly  oxygen, , inhalation, Continuous - Inhalation  pantoprazole, 40 " mg, oral, Daily  sacubitriL-valsartan, 1 tablet, oral, BID  simvastatin, 40 mg, oral, Nightly  warfarin, 2.5 mg, oral, Once per day on Sunday Tuesday Thursday Saturday  [START ON 11/11/2024] warfarin, 3.75 mg, oral, Once per day on Monday Wednesday Friday      Continuous medications     PRN medications  PRN medications: acetaminophen, ipratropium-albuteroL, polyethylene glycol                                I have personally reviewed the following imaging results CT head wo IV contrast    Result Date: 11/10/2024  Interpreted By:  George Pena, STUDY: CT HEAD WO IV CONTRAST;  11/10/2024 11:42 am   INDICATION: Signs/Symptoms:double vision.     COMPARISON: CT head dated 07/30/2022.   ACCESSION NUMBER(S): [id number]   ORDERING CLINICIAN: OWEN BALDWIN   TECHNIQUE: Noncontrast axial CT scan of head was performed.   FINDINGS: Parenchyma: There is no acute intracranial hemorrhage. No CT apparent acute infarct. There is no mass effect or midline shift. There is unchanged encephalomalacia within the anterior left MCA vascular territory involving the left frontal operculum and left anterior insular/subinsular region.   CSF Spaces: The ventricles, sulci and basal cisterns are stable in size and configuration.   Extra-Axial Fluid: There is no extraaxial fluid collection.   Calvarium: The calvarium is unremarkable.   Paranasal sinuses: Small bilateral maxillary mucous retention cysts. Remaining paranasal sinuses are clear.   Mastoids: Trace fluid within the inferior right mastoid. Otherwise well aerated.   Orbits: Bilateral native lens extractions. Otherwise unremarkable in CT appearance.   Soft tissues: Unremarkable.       No acute intracranial hemorrhage, mass effect, or CT apparent acute infarct.   Unchanged remote left anterior MCA territory infarct.   MACRO: None   Signed by: George Pena 11/10/2024 12:23 PM Dictation workstation:   [id number]         Assessment/Plan   Assessment & Plan      Mr. Clark is a 79 year old  man whom presented with shortness of breath and developed double vision in the ED.  Has had brief periods of double vision in the past.  EOMI intact with no obvious eye movement abnormality noted.   This makes a diabetic cause less likely however does not rule it out.  Maybe mild ptosis on the right otherwise no other focal neurologic deficits to suggest stroke so also unlikely to be the cause.  He will monitor the symptoms today and evaluate for evidence of fatigability.  Patient has pacemaker so unable to get MRI at [facility].     -check acetylcholine receptor binding antibody, hemAIC and CK  -if reports fatigability and symptoms persist tomorrow then trial of mestinon  -Neuro-opthalmology evaluation as outpatient and MRI as outpatient if no other cause found    Neurology will continue to follow.             Lela Simons, DO

## 2024-11-10 NOTE — CARE PLAN
The patient's goals for the shift include go for CT scan    The clinical goals for the shift include wean oxygen off      Problem: Safety - Adult  Goal: Free from fall injury  Outcome: Progressing     Problem: Discharge Planning  Goal: Discharge to home or other facility with appropriate resources  Outcome: Progressing

## 2024-11-10 NOTE — CONSULTS
Inpatient consult to Cardiology  Consult performed by: Nilesh Ritchie MD PhD  Consult ordered by: Akilah Valdes DO  Reason for consult: Heart failure, elevated troponin        History Of Present Illness:    This is an 79-year-old male who initially presented to [facility] on 11/10/2024 with sudden onset SOB beginning at approximately 10:30PM the night prior.  He reports sitting in the family room when he suddenly became SOB.  This was further exacerbated with physical activity as he walked to the bedroom and again when he attempted to lie down in bed.  He reports having dry cough with postnasal drip since 10/7 after getting his flu vaccine.  He otherwise denies any fever, chills, chest pain, or palpitations.     He was found to be hypoxic on RA after which he was placed on 4L NC.  Workup thus far notable for SCr 1.40, which is approximately his baseline.  BNP 4296.  Troponins mildly elevated from 27 > 29.  EKG shows v-paced rhythm with PVCs.  CXR shows pulmonary vascular congestion with left basilar opacity.  COVID and influenza PCR negative.     PMH:  Dilated cardiomyopathy with EF 23% as of 9/2020 per outpatient notes s/p CRT-D in 2005  SSS s/p PPM in 1998  Stage 1 NSCLC of RUL (dx 12/2022) s/p radiation  Paroxsymal afib, on Coumadin  CKD IIIa  Seizure disorder  DM2  Hypertension  Hyperlipidemia  Hx pancreatitis following cholecystectomy  Hx perforated diverticulitis s/p partial colectomy with anastomosis f/b colostomy reversal     PSH: CRT-D, appendectomy, cholecystectomy, partial colectomy with anastomosis f/b colostomy reversal hernia repair, bilateral cataract extraction  FH: DM, Alzheimer's, CHF  SH: Former smoker with 12-pack-year hx, quit in 1968. Denies alcohol or illicit drug use. , lives at home with wife. Ambulates independently.     Review of Systems:  12-point ROS was reviewed and is negative, unless otherwise noted in HPI           Last Recorded Vitals:  Vitals:    11/10/24 0601 11/10/24 0728  "11/10/24 0734 11/10/24 0741   BP: 123/67  120/58    BP Location: Left arm      Patient Position: Sitting      Pulse: 87  82    Resp:       Temp: 35.8 °C (96.4 °F)  36.8 °C (98.2 °F)    TempSrc: Temporal      SpO2: 95% 95% (!) 89% 92%   Weight: 70.7 kg (155 lb 12.1 oz)      Height: 1.626 m (5' 4\")          Last Labs:  CBC - 11/10/2024:  1:10 AM  9.4 11.9 184    34.8      CMP - 11/10/2024:  1:10 AM  8.8 7.2 15 --- 1.0   _ 4.1 11 68      PTT - 11/10/2024:  1:10 AM  2.8   31.7 36     Troponin I, High Sensitivity   Date/Time Value Ref Range Status   11/10/2024 02:16 AM 29 (H) 0 - 20 ng/L Final   11/10/2024 01:10 AM 27 (H) 0 - 20 ng/L Final     Troponin I   Date/Time Value Ref Range Status   07/03/2022 05:40 PM 20 0 - 20 ng/L Final     Comment:     .  Less than 99th percentile of normal range cutoff-  Female and children under 18 years old <14 ng/L; Male <21 ng/L: Negative  Repeat testing should be performed if clinically indicated.   .  Female and children under 18 years old 14-50 ng/L; Male 21-50 ng/L:  Consistent with possible cardiac damage and possible increased clinical   risk. Serial measurements may help to assess extent of myocardial damage.   .  >50 ng/L: Consistent with cardiac damage, increased clinical risk and  myocardial infarction. Serial measurements may help assess extent of   myocardial damage.   .   NOTE: Children less than 1 year old may have higher baseline troponin   levels and results should be interpreted in conjunction with the overall   clinical context.   .  NOTE: Troponin I testing is performed using a different   testing methodology at [facility] than at other   [facility] hospitals. Direct result comparisons should only   be made within the same method.       BNP   Date/Time Value Ref Range Status   11/10/2024 01:10 AM 4,296 (H) 0 - 99 pg/mL Final     Hemoglobin A1C   Date/Time Value Ref Range Status   03/22/2024 09:09 AM 6.2 (H) 4.3 - 5.6 % Final     Comment:     American Diabetes " Association guidelines indicate that patients with HgbA1c in the range 5.7-6.4% are at increased risk for development of diabetes, and intervention by lifestyle modification may be beneficial. HgbA1c greater or equal to 6.5% is considered diagnostic of diabetes.   04/25/2023 10:06 AM 6.1 (H) 4.3 - 5.6 % Final     Comment:     American Diabetes Association guidelines indicate that patients with HgbA1c in the range 5.7-6.4% are at increased risk for development of diabetes, and intervention by lifestyle modification may be beneficial. HgbA1c greater or equal to 6.5% is considered diagnostic of diabetes.     POCT Hemoglobin A1C   Date/Time Value Ref Range Status   09/11/2024 10:34 AM 6.2 (A) 4.3 - 5.6 % Final     Comment:     Location:[address], [place], [place],   [facility]  Point of care (POC) Hemoglobin A1c (HGBA1C) testing is intended to assess  glucose control and provide a management tool for patients known to have  diabetes and their healthcare providers.  Target HGBA1C levels may depend on  specific clinical circumstances.  POC HGBA1C is not intended for use as a  diagnostic or screening test; laboratory-based testing should be used for  diagnostic purposes.  The following information is supplemental and may not  be applicable to specific diabetes management situations:  The POC device   provides a normal range of 4.2% to 6.5% for the HGBA1C POC test.  However, the American Diabetes Association  guidelines indicate that  patients with HGBA1C in the range of 5.7% to 6.4% are at increased risk for  development of diabetes and that intervention by lifestyle modification may  be beneficial.  A HGBA1C level greater than or equal to 6.5% is considered  diagnostic of diabetes, pending confirmatory testing.  Use of HGBA1C testing  to evaluate glucose control may not be appropriate for patients with  hemoglobin variants or other conditions (e.g. anemia) that alter red  "blood  cell lifespan.   10/31/2023 10:07 AM 6.4 4.2 - 5.6 % Final     Comment:     Location:[facility], [address] [place] [address] Rd, [place], [place],   [address]  Point of care (POC) Hemoglobin A1c (HGBA1C) testing is intended to assess  glucose control and provide a management tool for patients known to have  diabetes and their healthcare providers.  Target HGBA1C levels may depend on  specific clinical circumstances.  POC HGBA1C is not intended for use as a  diagnostic or screening test; laboratory-based testing should be used for  diagnostic purposes.  The following information is supplemental and may not  be applicable to specific diabetes management situations:  The POC device   provides a normal range of 4.2% to 6.5% for the HGBA1C POC test.  However, the American Diabetes Association  guidelines indicate that  patients with HGBA1C in the range of 5.7% to 6.4% are at increased risk for  development of diabetes and that intervention by lifestyle modification may  be beneficial.  A HGBA1C level greater than or equal to 6.5% is considered  diagnostic of diabetes, pending confirmatory testing.  Use of HGBA1C testing  to evaluate glucose control may not be appropriate for patients with  hemoglobin variants or other conditions (e.g. anemia) that alter red blood  cell lifespan.      Last I/O:  No intake/output data recorded.    Past Cardiology Tests (Last 3 Years):  EKG:  No results found for this or any previous visit from the past 1095 days.    Echo:  No results found for this or any previous visit from the past 1095 days.    Ejection Fractions:  No results found for: \"EF\"  Cath:  No results found for this or any previous visit from the past 1095 days.    Stress Test:  No results found for this or any previous visit from the past 1095 days.    Cardiac Imaging:  No results found for this or any previous visit from the past 1095 days.      Past Medical History:  He has a past medical history of " Personal history of other diseases of the circulatory system (05/05/2015), Personal history of other diseases of the digestive system, Personal history of other infectious and parasitic diseases, Personal history of other infectious and parasitic diseases, Personal history of other infectious and parasitic diseases, Personal history of pneumonia (recurrent), and Personal history of transient ischemic attack (TIA), and cerebral infarction without residual deficits.    Past Surgical History:  He has a past surgical history that includes Cholecystectomy (12/21/2017); Cardiac pacemaker placement (12/21/2017); and Colectomy (12/21/2017).      Social History:  He reports that he has never smoked. He has never used smokeless tobacco. No history on file for alcohol use and drug use.    Family History:  No family history on file.     Allergies:  Morphine, Adalat, and Empagliflozin    Inpatient Medications:  Scheduled medications   Medication Dose Route Frequency    [START ON 11/11/2024] aspirin  81 mg oral Once per day on Monday Wednesday Friday    carvedilol  6.25 mg oral BID    cefTRIAXone  1 g intravenous q24h    digoxin  125 mcg oral Once per day on Sunday Monday Tuesday Wednesday Thursday Saturday    doxycycline  100 mg intravenous q12h    eplerenone  25 mg oral Daily    finasteride  5 mg oral Nightly    furosemide  20 mg intravenous q12h    insulin lispro  0-10 Units subcutaneous TID    levETIRAcetam  500 mg oral q AM    levETIRAcetam  750 mg oral Nightly    oxygen   inhalation Continuous - Inhalation    pantoprazole  40 mg oral Daily    sacubitriL-valsartan  1 tablet oral BID    simvastatin  40 mg oral Nightly    warfarin  2.5 mg oral Once per day on Sunday Tuesday Thursday Saturday    [START ON 11/11/2024] warfarin  3.75 mg oral Once per day on Monday Wednesday Friday     PRN medications   Medication    acetaminophen    ipratropium-albuteroL    polyethylene glycol     Continuous Medications   Medication Dose Last  Rate     Outpatient Medications:  Current Outpatient Medications   Medication Instructions    acetaminophen (TYLENOL 8 HOUR) 650 mg, Every 4 hours PRN    aspirin 81 mg, 3 times weekly    carvedilol (COREG) 6.25 mg, 2 times daily    digoxin (LANOXIN) 125 mcg, 6 times weekly    eplerenone (INSPRA) 25 mg, Daily    finasteride (PROSCAR) 5 mg, Nightly    levETIRAcetam (KEPPRA) 500 mg, Every morning    levETIRAcetam (KEPPRA) 750 mg, Nightly    omeprazole (PRILOSEC) 20 mg, Daily before breakfast    sacubitriL-valsartan (Entresto) 24-26 mg tablet 1 tablet, 2 times daily    simvastatin (ZOCOR) 40 mg, Nightly    torsemide (DEMADEX) 5 mg, Daily    Trulicity 1.5 mg, Weekly    warfarin (COUMADIN) 2.5 mg, Daily       Physical Exam:  Constitutional: well developed, awake, alert, no acute distress  ENMT: mucous membranes moist, conjunctivae clear  Head/Neck: normocephalic, atraumatic; supple, trachea midline  Respiratory/Thorax: diminished breath sounds, mild scattered wheezes, bibasilar crackles  Cardiovascular: RRR, no murmur appreciated  Gastrointestinal: soft, nondistended, non-tender, bowel sounds appreciated  Extremities: palpable peripheral pulses, no edema  Neurological: AO x3, no focal deficits  Psychological: appropriate mood and behavior  Skin: warm and dry        Assessment/Plan   Acute on chronic HFrEF with EF 23% as of 9/2020 per outpatient notes s/p CRT-D in 2005  NICM  Community-acquired PNA  Acute hypoxic respiratory failure  Elevated troponin, likely demand ischemia in setting of above  Immunocompromised status     SSS s/p PPM in 1998  Stage 1 NSCLC of RUL (dx 12/2022) s/p radiation  Paroxsymal afib, on Coumadin  CKD IIIa  Seizure disorder  DM2  Hypertension  Hyperlipidemia  Hx pancreatitis following cholecystectomy  Hx perforated diverticulitis s/p partial colectomy with anastomosis f/b colostomy reversal    Patient has presented with volume overload and congestion.  He feels much better as he reports.  He is  seen paced rhythm with a rate of 85.  Creatinine above baseline currently at 1.4.  Will monitor.  He reports he gets most of his cardiology care at [facility].    I agree with IV Lasix while monitoring renal function and electrolytes.  Need to keep potassium above 4 and magnesium above 2.  His mild elevated troponin is a type II myocardial infarction and not a true ACS.  Currently chest pain-free.  I will follow-up the patient tomorrow.        Code Status:  DNR      Nilesh Ritchie MD, PhD, [facility], [facility]  Interventional Cardiology, [facility]  Associate Professor of Medicine, [facility]   Office: [phone]

## 2024-11-10 NOTE — CARE PLAN
The patient's goals for the shift include      The clinical goals for the shift include      Over the shift, the patient did not make progress toward the following goals. Barriers to progression include Pt has altered oxygen needs. Recommendations to address these barriers include Maintain stable vitals; Maintain safety.

## 2024-11-10 NOTE — ED TRIAGE NOTES
Pt brought in via ems stating pt is from home and has juan luis having sob since 10/7,. But today it had gotten worst when he had to move from his bed to the .

## 2024-11-10 NOTE — ED PROVIDER NOTES
HPI   No chief complaint on file.      Patient is nontoxic-appearing 79-year-old male with a past medical history of automatic implantable cardioverter defibrillator in situ, BPH, CAD, chronic combined systolic and diastolic congestive heart failure, type 2 diabetes, gout, hyperlipidemia, atrial fibrillation, epilepsy, stage III chronic kidney disease, presents emergency room today for complaint of shortness of breath.  Patient states shortness of breath began yesterday after getting flu immunization however states became much worse today and is worse with activity.  Patient states he has had a thick congested cough and currently experiencing chest discomfort.  Patient states chest pain is midsternal and he feels as if he cannot catch his breath.  Patient denies any swelling of the arms or legs, missing any recent medication, abdominal pain, nausea vomiting, diarrhea or constipation.  Patient denies any fever, shaking, or chills, contact with known ill dividual's or recent travel.              Patient History   Past Medical History:   Diagnosis Date    Personal history of other diseases of the circulatory system 05/05/2015    History of ventricular tachycardia    Personal history of other diseases of the digestive system     History of hemorrhoids    Personal history of other infectious and parasitic diseases     History of chickenpox    Personal history of other infectious and parasitic diseases     History of measles    Personal history of other infectious and parasitic diseases     History of mumps    Personal history of pneumonia (recurrent)     History of pneumonia    Personal history of transient ischemic attack (TIA), and cerebral infarction without residual deficits     History of stroke     Past Surgical History:   Procedure Laterality Date    CARDIAC PACEMAKER PLACEMENT  12/21/2017    Pacemaker Placement    CHOLECYSTECTOMY  12/21/2017    Cholecystectomy Laparoscopic    COLECTOMY  12/21/2017    Partial  Colectomy     No family history on file.  Social History     Tobacco Use    Smoking status: Not on file    Smokeless tobacco: Not on file   Substance Use Topics    Alcohol use: Not on file    Drug use: Not on file       Physical Exam   ED Triage Vitals   Temp Pulse Resp BP   -- -- -- --      SpO2 Temp src Heart Rate Source Patient Position   -- -- -- --      BP Location FiO2 (%)     -- --       Physical Exam  Vitals and nursing note reviewed. Exam conducted with a chaperone present.   Constitutional:       General: He is not in acute distress.     Appearance: Normal appearance. He is not ill-appearing, toxic-appearing or diaphoretic.      Interventions: He is not intubated.  HENT:      Head: Normocephalic.      Nose: Nose normal.      Mouth/Throat:      Mouth: Mucous membranes are moist.      Pharynx: No oropharyngeal exudate or posterior oropharyngeal erythema.   Eyes:      General:         Right eye: No discharge.         Left eye: No discharge.      Extraocular Movements: Extraocular movements intact.      Pupils: Pupils are equal, round, and reactive to light.   Neck:      Vascular: No carotid bruit.   Cardiovascular:      Rate and Rhythm: Normal rate and regular rhythm.      Pulses: Normal pulses. No decreased pulses.      Heart sounds: Normal heart sounds. Heart sounds not distant. No murmur heard.     No friction rub. No gallop.   Pulmonary:      Effort: Pulmonary effort is normal. No tachypnea, bradypnea, accessory muscle usage, prolonged expiration, respiratory distress or retractions. He is not intubated.      Breath sounds: No stridor, decreased air movement or transmitted upper airway sounds. Rhonchi present. No decreased breath sounds, wheezing or rales.   Chest:      Chest wall: No tenderness.   Abdominal:      General: Abdomen is flat. Bowel sounds are normal. There is no distension.      Palpations: Abdomen is soft. There is no mass.      Tenderness: There is no abdominal tenderness. There is no  right CVA tenderness, left CVA tenderness, guarding or rebound.      Hernia: No hernia is present.   Musculoskeletal:         General: No swelling, tenderness, deformity or signs of injury. Normal range of motion.      Cervical back: Normal range of motion and neck supple. No rigidity or tenderness.      Right lower leg: No edema.      Left lower leg: No edema.   Lymphadenopathy:      Cervical: No cervical adenopathy.   Skin:     General: Skin is warm and dry.      Capillary Refill: Capillary refill takes less than 2 seconds.      Coloration: Skin is not jaundiced or pale.      Findings: No bruising, erythema, lesion or rash.   Neurological:      General: No focal deficit present.      Mental Status: He is alert and oriented to person, place, and time.           ED Course & [facility] [place]   ED Course as of 11/10/24 0222   Sun Nov 10, 2024   0108 EKG interpreted by myself reveals atrial sensed ventricular paced rhythm with PVCs at rate of 108 bpm with no ST elevation or T wave inversion, there is no previous EKG for comparison.  VT interval 126, QRS of 184 and QTc of 560. [EC]   0133 Chest x-ray reveals  IMPRESSION:  1. Cardiomegaly and findings most suggestive of mild edema.  Superimposed airspace disease is difficult to exclude.  2. Right pectoral generator pack has a new configuration with  increased number of ICD/pacing leads. Correlate for interval revision.  3. Increased density overlying the pacing leads at the level of the  right upper mediastinal border. It is unclear if this represents a  pulmonary, mediastinal, or superficial soft tissue density.   [EC]   0151 BNP(!): 4,296 [EC]   0158 Troponin I, High Sensitivity(!): 27 [EC]      ED Course User Index  [EC] TAYLOR Albert-CNP         Diagnoses as of 11/10/24 0222   SOB (shortness of breath)   Acute on chronic combined systolic and diastolic heart failure                 No data recorded                                 Medical Decision Making  Given patient's  complaint presentation a thorough exam was performed.  On exam patient does have diffuse rhonchorous lung sounds, cardiac sounds auscultated are regular, patient was placed on 4 L nasal cannula by EMS and route due to persistent shortness of breath.  There is no edema.  Bilateral lower extremities, breathing treatments were ordered upon arrival including Solu-Medrol, lab work was ordered including EKG and chest x-ray. Lab work reveals several irregularities with initial troponin of 27, BNP of 4296, remaining lab work is similar in comparison to previous lab values, chest x-ray as interpreted by radiologist reveals cardiomegaly and findings no suggestive of mild edema.  Given findings I do suspect patient is experiencing volume overload and acute on chronic congestive heart failure.  Patient received Lasix in the emergency room as well.  I consulted hospitalist Dr. Valdes in regards to admission.  Dr. Valdes has agreed to admit patient to her service and will manage inpatient care.    KARISSA Albert     Portions of this note were generated using digital voice recognition software, and may contain grammatical errors      Procedure  Procedures     KARISSA Albert  11/10/24 0222

## 2024-11-10 NOTE — PROGRESS NOTES
Subjective     Patient seen and examined. No acute overnight events.  Patient states that he feels better today than when he first came in.  He has been weaned to 2 L of oxygen via nasal cannula.  Still having some cough that has been going on for about a month.  Denies chest pain, fever/chills, nausea, vomiting, abdominal pain.  Reports double vision since last night.  Denies any associated headache, sensory deficits or weakness.      Assessment / Plan   Ernst Clark is a 79 y.o. male with a past medical history of dilated cardiomyopathy, sick sinus syndrome s/p pacemaker, non-small cell lung cancer, paroxysmal A-fib on Coumadin, CKD 3 AAA, seizure disorder, type 2 diabetes, hypertension who presented 11/10 with shortness of breath.    #Acute on chronic HFrEF exacerbation  -Presented with shortness of breath, BNP 4296, chest x-ray showing vascular congestion  -Echo pending, continue Lasix 20 mg twice daily, strict I's and O's, daily weights  -Cardiology consulted, continue home Coreg, Entresto, eplerenone    #Acute hypoxic respiratory failure 2/2 HFrEF exacerbation  -Currently on 4 L nasal cannula, continue oxygen and wean as tolerated for goal SpO2>92%    #Diplopia  -Acute onset of double vision last night around 11 PM  -NIHSS 0, patient denies headache or focal deficits  -CT head ordered, neurology consulted for additional workup    #C/f CAP  -RSV, COVID, flu negative, Pro-Raghu and urine antigens pending  -Will cover empirically with Rocephin and Doxycycline and de-escalate as able    #Paroxysmal A-fib   -EKG on admission showing ventricular paced rhythm with occasional PVCs  -Continue home Coumadin, digoxin 125 mcg    #Non-small cell lung cancer of RUL  #Hypertension  #CKD 3A  #Type 2 diabetes  #Seizure disorder  -ISS, continue statin, aspirin, Keppra, avoid nephrotoxic medications monitor kidney function while diuresing      Juarez Bright MD  PGY-1, Internal Medicine    This is a preliminary note, please  "await attending attestation for final recommendations      Objective   Physical Exam  Constitutional:       Appearance: Normal appearance.   Cardiovascular:      Rate and Rhythm: Normal rate and regular rhythm.   Pulmonary:      Effort: Pulmonary effort is normal. No respiratory distress.      Breath sounds: No rhonchi.   Abdominal:      General: Abdomen is flat.      Palpations: Abdomen is soft.   Musculoskeletal:      Right lower leg: No edema.      Left lower leg: No edema.   Skin:     General: Skin is warm and dry.   Neurological:      General: No focal deficit present.      Mental Status: He is alert and oriented to person, place, and time.   Psychiatric:         Mood and Affect: Mood normal.         Last Recorded Vitals  Blood pressure 123/67, pulse 87, temperature 35.8 °C (96.4 °F), temperature source Temporal, resp. rate 20, height 1.626 m (5' 4\"), weight 70.7 kg (155 lb 12.1 oz), SpO2 95%.  Intake/Output last 3 Shifts:  No intake/output data recorded.    Last CBC & BMP  Lab Results   Component Value Date    GLUCOSE 247 (H) 11/10/2024    CALCIUM 8.8 11/10/2024     11/10/2024    K 5.1 11/10/2024    CO2 21 11/10/2024     (H) 11/10/2024    BUN 28 (H) 11/10/2024    CREATININE 1.40 (H) 11/10/2024     Lab Results   Component Value Date    WBC 9.4 11/10/2024    HGB 11.9 (L) 11/10/2024    HCT 34.8 (L) 11/10/2024     11/10/2024     11/10/2024     "

## 2024-11-11 ENCOUNTER — APPOINTMENT (OUTPATIENT)
Dept: CARDIOLOGY | Facility: HOSPITAL | Age: 79
DRG: 280 | End: 2024-11-11
Payer: MEDICARE

## 2024-11-11 VITALS
DIASTOLIC BLOOD PRESSURE: 59 MMHG | OXYGEN SATURATION: 97 % | HEIGHT: 64 IN | SYSTOLIC BLOOD PRESSURE: 113 MMHG | HEART RATE: 78 BPM | BODY MASS INDEX: 26.29 KG/M2 | TEMPERATURE: 96.4 F | WEIGHT: 154 LBS | RESPIRATION RATE: 18 BRPM

## 2024-11-11 LAB
ANION GAP SERPL CALC-SCNC: 10 MMOL/L (ref 10–20)
AORTIC VALVE MEAN GRADIENT: 4 MMHG
AORTIC VALVE PEAK VELOCITY: 1.56 M/S
AV PEAK GRADIENT: 10 MMHG
AVA (PEAK VEL): 1.36 CM2
AVA (VTI): 1.88 CM2
BUN SERPL-MCNC: 41 MG/DL (ref 6–23)
CALCIUM SERPL-MCNC: 8.3 MG/DL (ref 8.6–10.3)
CHLORIDE SERPL-SCNC: 105 MMOL/L (ref 98–107)
CO2 SERPL-SCNC: 27 MMOL/L (ref 21–32)
CREAT SERPL-MCNC: 1.58 MG/DL (ref 0.5–1.3)
EGFRCR SERPLBLD CKD-EPI 2021: 44 ML/MIN/1.73M*2
EJECTION FRACTION APICAL 4 CHAMBER: 19.7
EJECTION FRACTION: 18 %
ERYTHROCYTE [DISTWIDTH] IN BLOOD BY AUTOMATED COUNT: 13.2 % (ref 11.5–14.5)
EST. AVERAGE GLUCOSE BLD GHB EST-MCNC: 134 MG/DL
GLUCOSE BLD MANUAL STRIP-MCNC: 161 MG/DL (ref 74–99)
GLUCOSE BLD MANUAL STRIP-MCNC: 171 MG/DL (ref 74–99)
GLUCOSE SERPL-MCNC: 179 MG/DL (ref 74–99)
HBA1C MFR BLD: 6.3 %
HCT VFR BLD AUTO: 26.1 % (ref 41–52)
HGB BLD-MCNC: 9.1 G/DL (ref 13.5–17.5)
INR PPP: 2.9 (ref 0.9–1.1)
LEFT ATRIUM VOLUME AREA LENGTH INDEX BSA: 64.1 ML/M2
LEFT VENTRICLE INTERNAL DIMENSION DIASTOLE: 7.6 CM (ref 3.5–6)
LEFT VENTRICULAR OUTFLOW TRACT DIAMETER: 2.2 CM
LEGIONELLA AG UR QL: NEGATIVE
MAGNESIUM SERPL-MCNC: 1.81 MG/DL (ref 1.6–2.4)
MCH RBC QN AUTO: 34.1 PG (ref 26–34)
MCHC RBC AUTO-ENTMCNC: 34.9 G/DL (ref 32–36)
MCV RBC AUTO: 98 FL (ref 80–100)
NRBC BLD-RTO: 0 /100 WBCS (ref 0–0)
PLATELET # BLD AUTO: 121 X10*3/UL (ref 150–450)
POTASSIUM SERPL-SCNC: 3.9 MMOL/L (ref 3.5–5.3)
PROCALCITONIN SERPL-MCNC: 0.12 NG/ML
PROTHROMBIN TIME: 33.6 SECONDS (ref 9.8–12.8)
RBC # BLD AUTO: 2.67 X10*6/UL (ref 4.5–5.9)
S PNEUM AG UR QL: NEGATIVE
SODIUM SERPL-SCNC: 138 MMOL/L (ref 136–145)
WBC # BLD AUTO: 8.1 X10*3/UL (ref 4.4–11.3)

## 2024-11-11 PROCEDURE — 2500000004 HC RX 250 GENERAL PHARMACY W/ HCPCS (ALT 636 FOR OP/ED)

## 2024-11-11 PROCEDURE — C8929 TTE W OR WO FOL WCON,DOPPLER: HCPCS

## 2024-11-11 PROCEDURE — 2500000001 HC RX 250 WO HCPCS SELF ADMINISTERED DRUGS (ALT 637 FOR MEDICARE OP): Performed by: STUDENT IN AN ORGANIZED HEALTH CARE EDUCATION/TRAINING PROGRAM

## 2024-11-11 PROCEDURE — 85610 PROTHROMBIN TIME: CPT | Performed by: INTERNAL MEDICINE

## 2024-11-11 PROCEDURE — 36415 COLL VENOUS BLD VENIPUNCTURE: CPT | Performed by: INTERNAL MEDICINE

## 2024-11-11 PROCEDURE — 83735 ASSAY OF MAGNESIUM: CPT

## 2024-11-11 PROCEDURE — 85027 COMPLETE CBC AUTOMATED: CPT

## 2024-11-11 PROCEDURE — 97165 OT EVAL LOW COMPLEX 30 MIN: CPT | Mod: GO

## 2024-11-11 PROCEDURE — 82947 ASSAY GLUCOSE BLOOD QUANT: CPT

## 2024-11-11 PROCEDURE — 80051 ELECTROLYTE PANEL: CPT

## 2024-11-11 PROCEDURE — 2500000004 HC RX 250 GENERAL PHARMACY W/ HCPCS (ALT 636 FOR OP/ED): Performed by: STUDENT IN AN ORGANIZED HEALTH CARE EDUCATION/TRAINING PROGRAM

## 2024-11-11 PROCEDURE — 36415 COLL VENOUS BLD VENIPUNCTURE: CPT

## 2024-11-11 PROCEDURE — 2500000002 HC RX 250 W HCPCS SELF ADMINISTERED DRUGS (ALT 637 FOR MEDICARE OP, ALT 636 FOR OP/ED): Performed by: STUDENT IN AN ORGANIZED HEALTH CARE EDUCATION/TRAINING PROGRAM

## 2024-11-11 PROCEDURE — 97161 PT EVAL LOW COMPLEX 20 MIN: CPT | Mod: GP

## 2024-11-11 PROCEDURE — 99239 HOSP IP/OBS DSCHRG MGMT >30: CPT | Performed by: INTERNAL MEDICINE

## 2024-11-11 PROCEDURE — 2500000002 HC RX 250 W HCPCS SELF ADMINISTERED DRUGS (ALT 637 FOR MEDICARE OP, ALT 636 FOR OP/ED)

## 2024-11-11 RX ORDER — MAGNESIUM SULFATE HEPTAHYDRATE 40 MG/ML
2 INJECTION, SOLUTION INTRAVENOUS ONCE
Status: COMPLETED | OUTPATIENT
Start: 2024-11-11 | End: 2024-11-11

## 2024-11-11 RX ORDER — POTASSIUM CHLORIDE 20 MEQ/1
20 TABLET, EXTENDED RELEASE ORAL ONCE
Status: COMPLETED | OUTPATIENT
Start: 2024-11-11 | End: 2024-11-11

## 2024-11-11 RX ADMIN — INSULIN LISPRO 2 UNITS: 100 INJECTION, SOLUTION INTRAVENOUS; SUBCUTANEOUS at 12:20

## 2024-11-11 RX ADMIN — INSULIN LISPRO 2 UNITS: 100 INJECTION, SOLUTION INTRAVENOUS; SUBCUTANEOUS at 08:59

## 2024-11-11 RX ADMIN — ASPIRIN 81 MG: 81 TABLET, COATED ORAL at 08:59

## 2024-11-11 RX ADMIN — MAGNESIUM SULFATE HEPTAHYDRATE 2 G: 40 INJECTION, SOLUTION INTRAVENOUS at 12:20

## 2024-11-11 RX ADMIN — CARVEDILOL 6.25 MG: 3.12 TABLET, FILM COATED ORAL at 08:59

## 2024-11-11 RX ADMIN — SACUBITRIL AND VALSARTAN 1 TABLET: 24; 26 TABLET, FILM COATED ORAL at 08:59

## 2024-11-11 RX ADMIN — CEFTRIAXONE SODIUM 1 G: 1 INJECTION, SOLUTION INTRAVENOUS at 08:59

## 2024-11-11 RX ADMIN — PERFLUTREN 2 ML OF DILUTION: 6.52 INJECTION, SUSPENSION INTRAVENOUS at 10:19

## 2024-11-11 RX ADMIN — DIGOXIN 125 MCG: 125 TABLET ORAL at 08:59

## 2024-11-11 RX ADMIN — LEVETIRACETAM 500 MG: 500 TABLET, FILM COATED ORAL at 08:59

## 2024-11-11 RX ADMIN — POTASSIUM CHLORIDE 20 MEQ: 1500 TABLET, EXTENDED RELEASE ORAL at 12:20

## 2024-11-11 RX ADMIN — PANTOPRAZOLE SODIUM 40 MG: 40 TABLET, DELAYED RELEASE ORAL at 08:59

## 2024-11-11 RX ADMIN — DOXYCYCLINE 100 MG: 100 INJECTION, POWDER, LYOPHILIZED, FOR SOLUTION INTRAVENOUS at 06:22

## 2024-11-11 RX ADMIN — EPLERENONE 25 MG: 25 TABLET, FILM COATED ORAL at 08:59

## 2024-11-11 ASSESSMENT — COGNITIVE AND FUNCTIONAL STATUS - GENERAL
HELP NEEDED FOR BATHING: A LITTLE
MOBILITY SCORE: 24
DAILY ACTIVITIY SCORE: 22
TOILETING: A LITTLE

## 2024-11-11 ASSESSMENT — PAIN - FUNCTIONAL ASSESSMENT: PAIN_FUNCTIONAL_ASSESSMENT: 0-10

## 2024-11-11 ASSESSMENT — PAIN SCALES - GENERAL
PAINLEVEL_OUTOF10: 0 - NO PAIN
PAINLEVEL_OUTOF10: 0 - NO PAIN

## 2024-11-11 ASSESSMENT — ACTIVITIES OF DAILY LIVING (ADL): BATHING_ASSISTANCE: STAND BY

## 2024-11-11 NOTE — CARE PLAN
The patient's goals for the shift include Breathe easier    The clinical goals for the shift include wean oxygen off    Over the shift, the patient did not make progress toward the following goals. Barriers to progression include Pt has altered oxygen needs; Suspected MI. Recommendations to address these barriers include Maintain stable vitals; Maintain safety.

## 2024-11-11 NOTE — CARE PLAN
The patient's goals for the shift include Breathe easier    The clinical goals for the shift include Maintain stable vitals; Maintain safety        Problem: Pain - Adult  Goal: Verbalizes/displays adequate comfort level or baseline comfort level  Outcome: Progressing     Problem: Not Adhering To Fluid Restrictions  Goal: Patient will verbalize understanding of fluid restrictions  Outcome: Progressing

## 2024-11-11 NOTE — PROGRESS NOTES
11/11/24 1159   Discharge Planning   Living Arrangements Spouse/significant other   Support Systems Spouse/significant other;Children;Family members   Assistance Needed Independent   Type of Residence Private residence   Home or Post Acute Services None   Expected Discharge Disposition Home   Does the patient need discharge transport arranged? No   Patient Choice   Patient / Family choosing to utilize agency / facility established prior to hospitalization No     Care transitions assessment completed via bedside with patient. TCC introduced self and explained role. Demographics verified. Patient from home with spouse.  Independent PTA. Uses walker/cane with gout flare up, which is rarely. Denies SW needs at this time. Patient anticipates no skilled needs at discharge. Dispo pending hospital course, await PT/OT evals, but patient states doesn't need HHC or any therapy at dc. Patient anticipating discharge today. Patient's family to transport home at time of discharge. TCC to continue to follow for discharge planning needs.      PCP: New PCP appointment with [facility] provider, unsure of name  Last seen: N/A  Pharmacy: [facility]  Insurance: [facility] Medicare  Dispo: Home declined needs     NANI VALENTIN RN TCC

## 2024-11-11 NOTE — PROGRESS NOTES
Cardiology Progress    Impression:  Community-acquired pneumonia  Acute hypoxic respiratory failure  AMALIA  HFrEF.  Now with AMALIA, relative intravascular volume depletion.  Nonischemic cardiomyopathy.  EF 25%.  Paroxysmal atrial fibrillation.  Currently sinus rhythm.  Anticoagulated with Coumadin.  Permanent pacemaker.  Anemia  Stage I NSCLC of right upper lobe.  Hypertension  Hyperlipidemia  Diabetes  Plan:  Discontinue IV Lasix  Follow labs  Management of pneumonia per medical team  Wean off oxygen  Ambulate  Patient will follow-up with his regular cardiologist at [facility] after discharge.  HPI:  Breathing better.  Still on some oxygen.  Still has a cough and sore throat.  No chest pain.  No palpitations.  Meds:  Scheduled medications  aspirin, 81 mg, oral, Once per day on Monday Wednesday Friday  carvedilol, 6.25 mg, oral, BID  cefTRIAXone, 1 g, intravenous, q24h  digoxin, 125 mcg, oral, Once per day on Sunday Monday Tuesday Wednesday Thursday Saturday  doxycycline, 100 mg, intravenous, q12h  eplerenone, 25 mg, oral, Daily  finasteride, 5 mg, oral, Nightly  insulin lispro, 0-10 Units, subcutaneous, TID  levETIRAcetam, 500 mg, oral, q AM  levETIRAcetam, 750 mg, oral, Nightly  magnesium sulfate, 2 g, intravenous, Once  oxygen, , inhalation, Continuous - Inhalation  pantoprazole, 40 mg, oral, Daily  potassium chloride CR, 20 mEq, oral, Once  sacubitriL-valsartan, 1 tablet, oral, BID  simvastatin, 40 mg, oral, Nightly  warfarin, 2.5 mg, oral, Once per day on Sunday Tuesday Thursday Saturday  warfarin, 3.75 mg, oral, Once per day on Monday Wednesday Friday      Continuous medications     PRN medications  PRN medications: acetaminophen, ipratropium-albuteroL, polyethylene glycol    Physical exam:  Vitals:    11/11/24 0747   BP: 109/58   Pulse: 79   Resp: 18   Temp: 35.3 °C (95.5 °F)   SpO2: 94%      No JVD.  Chest clear.  No edema.  EKG:  Telemetry shows sinus rhythm with ventricular pacing.  Echo:  No results found for this  or any previous visit.   Labs:  Lab Results   Component Value Date    WBC 8.1 11/11/2024    HGB 9.1 (L) 11/11/2024    HCT 26.1 (L) 11/11/2024     (L) 11/11/2024    ALT 11 11/10/2024    AST 15 11/10/2024     11/11/2024    K 3.9 11/11/2024     11/11/2024    CREATININE 1.58 (H) 11/11/2024    BUN 41 (H) 11/11/2024    CO2 27 11/11/2024    INR 2.8 (H) 11/10/2024    HGBA1C 6.2 (A) 09/11/2024     par

## 2024-11-11 NOTE — PROGRESS NOTES
Physical Therapy    Physical Therapy Evaluation    Patient Name: Ernst Clark  MRN: [id number]  Today's Date: 11/11/2024   Time Calculation  Start Time: 1152  Stop Time: 1205  Time Calculation (min): 13 min  904/904-A    Assessment/Plan   PT Assessment  End of Session Communication: Bedside nurse  End of Session Patient Position: Alarm off, not on at start of session, Up in chair (All needs in reach and no complaints noted)  IP OR SWING BED PT PLAN  Inpatient or Swing Bed: Inpatient  PT Plan  PT Plan: PT Eval only  PT Eval Only Reason: At baseline function  PT Frequency: PT eval only  PT Discharge Recommendations: No PT needed after discharge  PT - OK to Discharge: Yes    Subjective     Current Problem:  1. SOB (shortness of breath)        2. Acute on chronic combined systolic and diastolic heart failure        3. Acute on chronic systolic (congestive) heart failure  Transthoracic Echo (TTE) Complete    Transthoracic Echo (TTE) Complete    Referral to Neurology        Patient Active Problem List   Diagnosis    Automatic implantable cardioverter-defibrillator in situ    Benign prostatic hyperplasia    CAD (coronary artery disease)    Chest pain    Chronic combined systolic (congestive) and diastolic (congestive) heart failure    Congestive heart failure    Diabetes mellitus (Multi)    Gout    Hyperlipemia, mixed    Malfunction of implantable defibrillator ventricular (ICD) lead    Nonrheumatic mitral valve regurgitation    Atrial fibrillation (Multi)    Seizure disorder (Multi)    Spinal stenosis of lumbar region    CKD stage 3 due to type 2 diabetes mellitus (Multi)    Controlled type 2 diabetes mellitus without complication, without long-term current use of insulin (Multi)    Viral upper respiratory tract infection    SOB (shortness of breath)     General Visit Information:  General  Reason for Referral: PT Eval and Treat  Referred By: Akilah Valdes DO  Past Medical History Relevant to Rehab: 79-year-old male who  initially presented to [facility] on 11/10/2024 with sudden onset SOB beginning at approximately 10:30PM the night prior.  He reports sitting in the family room when he suddenly became SOB.  This was further exacerbated with physical activity as he walked to the bedroom and again when he attempted to lie down in bed.  He reports having dry cough with postnasal drip since 10/7 after getting his flu vaccine.  Co-Treatment: OT  Co-Treatment Reason: maximize safety and functional mobility  Prior to Session Communication: Bedside nurse  Patient Position Received: Bed, 2 rail up, Alarm off, not on at start of session (Agreeable to PT)    Home Living:  Home Living  Home Living Comments: Pt lives with his wife in a 1 story house with 2 small RASHI with no HR. Bathroom has a walk in shower with no adaptive equipment and a standard toilet.    Prior Level of Function:  Prior Function Per Pt/Caregiver Report  Level of [place]: Independent with ADLs and functional transfers, Independent with homemaking with ambulation (Pt amb without an AD, occasionally uses a SPC or RW if having a gout flare up, wife does IADLs and driving)    Objective     Pain:  Pain Assessment  Pain Assessment:  (0/10)    Cognition:  Cognition  Overall Cognitive Status: Within Functional Limits    General Assessments:  Sensation  Light Touch: No apparent deficits  Strength  Strength Comments: B LE ROM and strength WFL  Dynamic Standing Balance  Dynamic Standing-Comments: Good    Functional Assessments:  Bed Mobility  Bed Mobility:  (independent)  Transfers  Transfer:  (independent)  Ambulation/Gait Training  Ambulation/Gait Training Performed:  (pt amb 100' x 2 without an AD independently)     Outcome Measures:  [facility] Basic Mobility  Turning from your back to your side while in a flat bed without using bedrails: None  Moving from lying on your back to sitting on the side of a flat bed without using bedrails: None  Moving to and from bed to chair (including a  wheelchair): None  Standing up from a chair using your arms (e.g. wheelchair or bedside chair): None  To walk in hospital room: None  Climbing 3-5 steps with railing: None  Basic Mobility - Total Score: 24    Education Documentation  No documentation found.  Education Comments  No comments found.

## 2024-11-11 NOTE — DISCHARGE SUMMARY
Discharge Diagnosis  SOB (shortness of breath)  Acute on chronic HFpEF exacerbation  Diplopia  Hypertension  Seizure disorder  Issues Requiring Follow-Up  Establish with neurology for diplopia workup  Follow-up with cardiologist at [facility]    Discharge Meds     Medication List      CONTINUE taking these medications     acetaminophen 650 mg ER tablet; Commonly known as: Tylenol 8 HOUR   aspirin 81 mg EC tablet   carvedilol 6.25 mg tablet; Commonly known as: Coreg   digoxin 125 MCG tablet; Commonly known as: Lanoxin   Entresto 24-26 mg tablet; Generic drug: sacubitriL-valsartan   eplerenone 25 mg tablet; Commonly known as: Inspra   finasteride 5 mg tablet; Commonly known as: Proscar   * levETIRAcetam 500 mg tablet; Commonly known as: Keppra   * levETIRAcetam 750 mg tablet; Commonly known as: Keppra   omeprazole 20 mg DR capsule; Commonly known as: PriLOSEC   simvastatin 40 mg tablet; Commonly known as: Zocor   torsemide 5 mg tablet; Commonly known as: Demadex   Trulicity 1.5 mg/0.5 mL pen injector injection; Generic drug:   dulaglutide   warfarin 2.5 mg tablet; Commonly known as: Coumadin  * This list has 2 medication(s) that are the same as other medications   prescribed for you. Read the directions carefully, and ask your doctor or   other care provider to review them with you.       Test Results Pending At Discharge  Pending Labs       Order Current Status    Acetylcholine receptor, binding In process    Hemoglobin A1c In process    Mycoplasma Pneumoniae Antibody, IgM In process            Hospital Course   79-year-old male with a past medical history of HFrEF, sick sinus syndrome s/p pacemaker, Non-small cell lung cancer, Paroxysmal A-fib, CKD 3A, Seizure disorder, Hypertension presented with acute onset of shortness of breath and exertional dyspnea.  On admission patient had elevated BNP and chest x-ray findings suggestive of pulmonary congestion.  Cardiology was consulted for likely HFrEF exacerbation  and the patient was started on 20 mg IV Lasix twice daily.  Was initially placed on 4 L nasal cannula but weaned rather quickly to room air so IV Lasix were discontinued.  He was also empirically covered for community-acquired pneumonia, however this was less likely as the patient was afebrile and was without other objective findings.  He also started experiencing diplopia during the night of the hospitalization and neurology was consulted.  They ordered acetylcholine esterase antibodies which at this time are still pending.  And recommend follow-up with a neurologic ophthalmologist.  Upon further discussion with patient, symptoms of diplopia has been going on and off for the past few years.  On day of discharge, his vision is improved and he is not seeing double.  Patient is stable for discharge home with follow up as instructed.  He is agreeable to this plan and will follow-up with his cardiologist at [facility] and with a neurologist.    Pertinent Physical Exam At Time of Discharge  Physical Exam  Constitutional:       Appearance: Normal appearance.   Eyes:      Pupils: Pupils are equal, round, and reactive to light.   Cardiovascular:      Rate and Rhythm: Normal rate and regular rhythm.   Pulmonary:      Effort: Pulmonary effort is normal.      Breath sounds: Normal breath sounds.   Abdominal:      General: Abdomen is flat.      Palpations: Abdomen is soft.   Musculoskeletal:      Right lower leg: No edema.      Left lower leg: No edema.   Skin:     General: Skin is warm and dry.   Neurological:      General: No focal deficit present.      Mental Status: He is alert and oriented to person, place, and time.      Cranial Nerves: No cranial nerve deficit.      Sensory: No sensory deficit.   Psychiatric:         Mood and Affect: Mood normal.       Outpatient Follow-Up  No future appointments.      Juarez Bright MD

## 2024-11-11 NOTE — NURSING NOTE
Met with patient and wife at the bedside. Discharge Planning discussed. AVS updated with follow-ups, education, and medication information. Verified/ entered a PCP and pharmacy. New medications and side effects discussed. All questions answered.  Updated nurse on this info. AVS printed and hi-lighted. IV and tele removed.

## 2024-11-11 NOTE — DISCHARGE INSTRUCTIONS
1.  Please follow-up with your PCP within 2 weeks of discharge and update them on your hospital course so they may make any medication adjustments necessary.  2.  Follow-up with your cardiologist at [facility] at your next available appointment.  3. We have ordered a referral to a neurologic ophthalmologist for you to follow up with your double vision. You may have your PCP refer you to a Neurologist as well.

## 2024-11-11 NOTE — PROGRESS NOTES
Occupational Therapy    Evaluation    Patient Name: Ernst Clark  MRN: [id number]  Today's Date: 11/11/2024  Time Calculation  Start Time: 1151  Stop Time: 1205  Time Calculation (min): 14 min  904/904-A    Assessment  IP OT Assessment  OT Assessment: pt showing good movement and ability to complete adls at bedside.  Evaluation/Treatment Tolerance: Patient tolerated treatment well  Medical Staff Made Aware: Yes  End of Session Communication: Bedside nurse  End of Session Patient Position: Alarm off, not on at start of session, Up in chair (All needs in reach and no complaints noted. Up in chair for lunch.)    Plan:  No Skilled OT: No acute OT goals identified  OT Frequency: OT eval only  OT Discharge Recommendations: No further acute OT  OT - OK to Discharge: Yes    Subjective     Current Problem:  1. SOB (shortness of breath)        2. Acute on chronic combined systolic and diastolic heart failure        3. Acute on chronic systolic (congestive) heart failure  Transthoracic Echo (TTE) Complete    Transthoracic Echo (TTE) Complete    Referral to Neurology          General:  General  Reason for Referral: OT Eval and Treat for adls and safety assessment  Referred By: Akilah Valdes DO  Past Medical History Relevant to Rehab: 79-year-old male who initially presented to [facility] on 11/10/2024 with sudden onset SOB beginning at approximately 10:30PM the night prior.  He reports sitting in the family room when he suddenly became SOB.  This was further exacerbated with physical activity as he walked to the bedroom and again when he attempted to lie down in bed.  He reports having dry cough with postnasal drip since 10/7 after getting his flu vaccine.  Co-Treatment: PT  Co-Treatment Reason: maximize safety and functional mobility  Prior to Session Communication: Bedside nurse  Patient Position Received: Bed, 2 rail up, Alarm off, not on at start of session (Agreeable to PT)  General Comment: Pt pleasant and cooperative, agreeable  to therapy and reporting that he is feeling pretty good.  Just returned from test.    Precautions:   Seizure precautions,    Pain:  Pain Assessment  Pain Assessment: 0-10  0-10 (Numeric) Pain Score: 0 - No pain    Objective     Cognition:  Overall Cognitive Status: Within Functional Limits  Orientation Level: Oriented X4       Home Living:  Type of Home:  (Pt lives with his wife in a 1 story house with 2 small RASHI with no HR. Bathroom has a walk in shower with no adaptive equipment and a standard toilet.)     Prior Function:  Level of [place]: Independent with ADLs and functional transfers, Independent with homemaking with ambulation (Pt amb without an AD, occasionally uses a SPC or RW if having a gout flare up, wife does IADLs and driving)  Prior Function Comments: Pt reports he is normallly able to ambulate without a device but when gout acts up, he uses a caneor ww.    IADL History:  Homemaking Responsibilities:  (wife does  IADL, he does some light IADLs.)    ADL:  Eating Assistance: Independent  Grooming Assistance: Stand by  Bathing Assistance: Stand by  UE Dressing Assistance: Independent  LE Dressing Assistance: Stand by  Toileting Assistance with Device: Stand by  Functional Assistance: Modified independent    Activity Tolerance:  Endurance: Endurance does not limit participation in activity    Bed Mobility/Transfers:   Bed Mobility  Bed Mobility:  (independent)  Transfers  Transfer:  (independent)      Vision: Vision - Basic Assessment  Current Vision:  (at time of eval, pt did not report any double vision.  He stated sometimes it will come and go during the day.)   and      Sensation:  Light Touch: No apparent deficits    Strength:  Strength Comments: BUE wfl    Perception:       Coordination:  Movements are Fluid and Coordinated: Yes  Finger to Nose: Intact     Hand Function:  Hand Function  Gross Grasp: Functional  Coordination: Functional    Extremities: RUE   RUE : Within Functional Limits and  LUE   LUE: Within Functional Limits    Outcome Measures: [facility] Daily Activity  Putting on and taking off regular lower body clothing: None  Bathing (including washing, rinsing, drying): A little  Putting on and taking off regular upper body clothing: None  Toileting, which includes using toilet, bedpan or urinal: A little  Taking care of personal grooming such as brushing teeth: None  Eating Meals: None  Daily Activity - Total Score: 22       EDUCATION:     Education Documentation  No documentation found.  Education Comments  No comments found.

## 2024-11-13 LAB — ACHR BIND AB SER-SCNC: 0 NMOL/L (ref 0–0.4)

## 2024-11-15 LAB — M PNEUMO IGM SER IA-ACNC: 0.26 U/L

## 2024-11-16 LAB
ATRIAL RATE: 101 BPM
P AXIS: 12 DEGREES
P OFFSET: 154 MS
P ONSET: 126 MS
PR INTERVAL: 126 MS
Q ONSET: 179 MS
QRS COUNT: 17 BEATS
QRS DURATION: 184 MS
QT INTERVAL: 418 MS
QTC CALCULATION(BAZETT): 560 MS
QTC FREDERICIA: 508 MS
R AXIS: 169 DEGREES
T AXIS: 4 DEGREES
T OFFSET: 388 MS
VENTRICULAR RATE: 108 BPM

## 2024-11-18 NOTE — DOCUMENTATION CLARIFICATION NOTE
"    PATIENT:               [facility] JOYNER  [id number]  MRN:                       [id number]  :                       1945  ADMIT DATE:       11/10/2024 12:39 AM  DISCH DATE:        2024 3:52 PM  RESPONDING PROVIDER #:        [id number]          PROVIDER RESPONSE TEXT:    Non Ischemic Acute Myocardial Injury    CDI QUERY TEXT:    Clarification      Instruction:    Based on your assessment of the patient and the clinical information, please provide the requested documentation by clicking on the appropriate radio button and enter any additional information if prompted.    Question: Please further clarify the diagnosis of Type II MI as    When answering this query, please exercise your independent professional judgment. The fact that a question is being asked, does not imply that any particular answer is desired or expected.    The patient's clinical indicators include:  Clinical Information: 78 yo male admitted with acute on chronic HFpEF exacerbation, elevated troponin, HTN, and Acute hypoxic respiratory failure    Documented Diagnosis: \"type II MI\"    Clinical Indicators and Documentation:  -Vital Signs:  11/10 0100) Temp-n/a HR-103  RR-n/a BP-144/95 SPO2-95 4L, 89 RA (11/10 0734)  -Troponin trend: 27 (11/10 0110), 29 ( 0216)  -EKG findings: Atrial-sensed ventricular-paced rhythm with occasional Premature ventricular complexes Biventricular pacemaker detected  -ECHO findings: CONCLUSIONS: ()  1. Left ventricular cavity size is severely dilated.  2. Left ventricular ejection fraction is severely decreased, by visual estimate at 15-20%.  3. There is global hypokinesis of the left ventricle with minor regional variations.    -Physical Exam or Documented/Presenting symptoms: denies CP, +SOB, no edema  -Cardiac Interventions: ECHO, EKG, consult  -Cardiac Consult: 11/10 Consult Dr. Ritchie: \"His mild elevated troponin is a type II myocardial infarction and not a true ACS. " "Currently chest pain-free.\"    Treatment: cardiology consult, lasix IV, trend troponins    Risk Factors: elevated troponin  Options provided:  -- Type II MI as evidenced by, Please specify additional information below  -- Non Ischemic Acute Myocardial Injury  -- Other - I will add my own diagnosis  -- Refer to Clinical Documentation Reviewer    Query created by: Shanda Christie on 11/15/2024 4:38 PM      Electronically signed by:  ELISSA PARKER MD PhD 11/18/2024 9:43 AM          "

## 2025-02-15 ENCOUNTER — APPOINTMENT (OUTPATIENT)
Dept: CARDIOLOGY | Facility: HOSPITAL | Age: 80
DRG: 291 | End: 2025-02-15
Payer: MEDICARE

## 2025-02-15 ENCOUNTER — HOSPITAL ENCOUNTER (INPATIENT)
Facility: HOSPITAL | Age: 80
DRG: 291 | End: 2025-02-15
Attending: STUDENT IN AN ORGANIZED HEALTH CARE EDUCATION/TRAINING PROGRAM | Admitting: INTERNAL MEDICINE
Payer: MEDICARE

## 2025-02-15 ENCOUNTER — APPOINTMENT (OUTPATIENT)
Dept: RADIOLOGY | Facility: HOSPITAL | Age: 80
DRG: 291 | End: 2025-02-15
Payer: MEDICARE

## 2025-02-15 DIAGNOSIS — R09.02 HYPOXIA: ICD-10-CM

## 2025-02-15 DIAGNOSIS — I50.23 ACUTE ON CHRONIC HEART FAILURE WITH REDUCED EJECTION FRACTION (HFREF, <= 40%): ICD-10-CM

## 2025-02-15 DIAGNOSIS — I50.23 ACUTE ON CHRONIC SYSTOLIC HEART FAILURE: Primary | ICD-10-CM

## 2025-02-15 DIAGNOSIS — R06.02 SHORTNESS OF BREATH: ICD-10-CM

## 2025-02-15 LAB
ALBUMIN SERPL BCP-MCNC: 4 G/DL (ref 3.4–5)
ALP SERPL-CCNC: 70 U/L (ref 33–136)
ALT SERPL W P-5'-P-CCNC: 14 U/L (ref 10–52)
ANION GAP SERPL CALC-SCNC: 16 MMOL/L (ref 10–20)
AST SERPL W P-5'-P-CCNC: 19 U/L (ref 9–39)
BASOPHILS # BLD AUTO: 0.04 X10*3/UL (ref 0–0.1)
BASOPHILS NFR BLD AUTO: 0.6 %
BILIRUB SERPL-MCNC: 1.1 MG/DL (ref 0–1.2)
BNP SERPL-MCNC: 2805 PG/ML (ref 0–99)
BUN SERPL-MCNC: 30 MG/DL (ref 6–23)
CALCIUM SERPL-MCNC: 8.8 MG/DL (ref 8.6–10.3)
CARDIAC TROPONIN I PNL SERPL HS: 26 NG/L (ref 0–20)
CHLORIDE SERPL-SCNC: 104 MMOL/L (ref 98–107)
CO2 SERPL-SCNC: 23 MMOL/L (ref 21–32)
CREAT SERPL-MCNC: 1.61 MG/DL (ref 0.5–1.3)
EGFRCR SERPLBLD CKD-EPI 2021: 43 ML/MIN/1.73M*2
EOSINOPHIL # BLD AUTO: 0.46 X10*3/UL (ref 0–0.4)
EOSINOPHIL NFR BLD AUTO: 7.4 %
ERYTHROCYTE [DISTWIDTH] IN BLOOD BY AUTOMATED COUNT: 13.8 % (ref 11.5–14.5)
FLUAV RNA RESP QL NAA+PROBE: NOT DETECTED
FLUBV RNA RESP QL NAA+PROBE: NOT DETECTED
GLUCOSE SERPL-MCNC: 163 MG/DL (ref 74–99)
HCT VFR BLD AUTO: 30.8 % (ref 41–52)
HGB BLD-MCNC: 10.3 G/DL (ref 13.5–17.5)
IMM GRANULOCYTES # BLD AUTO: 0.02 X10*3/UL (ref 0–0.5)
IMM GRANULOCYTES NFR BLD AUTO: 0.3 % (ref 0–0.9)
LYMPHOCYTES # BLD AUTO: 1.34 X10*3/UL (ref 0.8–3)
LYMPHOCYTES NFR BLD AUTO: 21.5 %
MAGNESIUM SERPL-MCNC: 1.98 MG/DL (ref 1.6–2.4)
MCH RBC QN AUTO: 33.4 PG (ref 26–34)
MCHC RBC AUTO-ENTMCNC: 33.4 G/DL (ref 32–36)
MCV RBC AUTO: 100 FL (ref 80–100)
MONOCYTES # BLD AUTO: 0.54 X10*3/UL (ref 0.05–0.8)
MONOCYTES NFR BLD AUTO: 8.7 %
NEUTROPHILS # BLD AUTO: 3.84 X10*3/UL (ref 1.6–5.5)
NEUTROPHILS NFR BLD AUTO: 61.5 %
NRBC BLD-RTO: 0 /100 WBCS (ref 0–0)
PLATELET # BLD AUTO: 186 X10*3/UL (ref 150–450)
POTASSIUM SERPL-SCNC: 4.2 MMOL/L (ref 3.5–5.3)
PROT SERPL-MCNC: 7 G/DL (ref 6.4–8.2)
RBC # BLD AUTO: 3.08 X10*6/UL (ref 4.5–5.9)
RSV RNA RESP QL NAA+PROBE: NOT DETECTED
SARS-COV-2 RNA RESP QL NAA+PROBE: NOT DETECTED
SODIUM SERPL-SCNC: 139 MMOL/L (ref 136–145)
WBC # BLD AUTO: 6.2 X10*3/UL (ref 4.4–11.3)

## 2025-02-15 PROCEDURE — 71046 X-RAY EXAM CHEST 2 VIEWS: CPT

## 2025-02-15 PROCEDURE — 85025 COMPLETE CBC W/AUTO DIFF WBC: CPT

## 2025-02-15 PROCEDURE — 99285 EMERGENCY DEPT VISIT HI MDM: CPT | Mod: 25 | Performed by: STUDENT IN AN ORGANIZED HEALTH CARE EDUCATION/TRAINING PROGRAM

## 2025-02-15 PROCEDURE — 96374 THER/PROPH/DIAG INJ IV PUSH: CPT

## 2025-02-15 PROCEDURE — 36415 COLL VENOUS BLD VENIPUNCTURE: CPT

## 2025-02-15 PROCEDURE — 93005 ELECTROCARDIOGRAM TRACING: CPT

## 2025-02-15 PROCEDURE — 80053 COMPREHEN METABOLIC PANEL: CPT

## 2025-02-15 PROCEDURE — 87637 SARSCOV2&INF A&B&RSV AMP PRB: CPT

## 2025-02-15 PROCEDURE — 71046 X-RAY EXAM CHEST 2 VIEWS: CPT | Performed by: RADIOLOGY

## 2025-02-15 PROCEDURE — 83735 ASSAY OF MAGNESIUM: CPT

## 2025-02-15 PROCEDURE — 83880 ASSAY OF NATRIURETIC PEPTIDE: CPT

## 2025-02-15 PROCEDURE — 2500000004 HC RX 250 GENERAL PHARMACY W/ HCPCS (ALT 636 FOR OP/ED)

## 2025-02-15 PROCEDURE — 84484 ASSAY OF TROPONIN QUANT: CPT

## 2025-02-15 RX ORDER — FUROSEMIDE 10 MG/ML
20 INJECTION INTRAMUSCULAR; INTRAVENOUS ONCE
Status: COMPLETED | OUTPATIENT
Start: 2025-02-15 | End: 2025-02-15

## 2025-02-15 RX ADMIN — FUROSEMIDE 20 MG: 10 INJECTION, SOLUTION INTRAMUSCULAR; INTRAVENOUS at 23:42

## 2025-02-15 ASSESSMENT — COLUMBIA-SUICIDE SEVERITY RATING SCALE - C-SSRS
6. HAVE YOU EVER DONE ANYTHING, STARTED TO DO ANYTHING, OR PREPARED TO DO ANYTHING TO END YOUR LIFE?: NO
2. HAVE YOU ACTUALLY HAD ANY THOUGHTS OF KILLING YOURSELF?: NO
1. IN THE PAST MONTH, HAVE YOU WISHED YOU WERE DEAD OR WISHED YOU COULD GO TO SLEEP AND NOT WAKE UP?: NO

## 2025-02-15 ASSESSMENT — PAIN - FUNCTIONAL ASSESSMENT: PAIN_FUNCTIONAL_ASSESSMENT: 0-10

## 2025-02-15 ASSESSMENT — PAIN SCALES - GENERAL: PAINLEVEL_OUTOF10: 0 - NO PAIN

## 2025-02-16 ENCOUNTER — APPOINTMENT (OUTPATIENT)
Dept: CARDIOLOGY | Facility: HOSPITAL | Age: 80
DRG: 291 | End: 2025-02-16
Payer: MEDICARE

## 2025-02-16 VITALS
SYSTOLIC BLOOD PRESSURE: 114 MMHG | OXYGEN SATURATION: 97 % | HEIGHT: 64 IN | BODY MASS INDEX: 25.61 KG/M2 | DIASTOLIC BLOOD PRESSURE: 62 MMHG | HEART RATE: 89 BPM | WEIGHT: 150 LBS | TEMPERATURE: 97.7 F | RESPIRATION RATE: 18 BRPM

## 2025-02-16 PROBLEM — R06.02 SHORTNESS OF BREATH: Status: ACTIVE | Noted: 2025-02-16

## 2025-02-16 PROBLEM — I50.23 ACUTE ON CHRONIC SYSTOLIC HEART FAILURE: Status: ACTIVE | Noted: 2025-02-16

## 2025-02-16 LAB
ANION GAP BLDV CALCULATED.4IONS-SCNC: 19 MMOL/L (ref 10–25)
ANION GAP SERPL CALC-SCNC: 16 MMOL/L (ref 10–20)
BASE EXCESS BLDV CALC-SCNC: -4.2 MMOL/L (ref -2–3)
BODY TEMPERATURE: 37 DEGREES CELSIUS
BUN SERPL-MCNC: 29 MG/DL (ref 6–23)
CA-I BLDV-SCNC: 1.19 MMOL/L (ref 1.1–1.33)
CALCIUM SERPL-MCNC: 8.6 MG/DL (ref 8.6–10.3)
CARDIAC TROPONIN I PNL SERPL HS: 30 NG/L (ref 0–20)
CHLORIDE BLDV-SCNC: 107 MMOL/L (ref 98–107)
CHLORIDE SERPL-SCNC: 107 MMOL/L (ref 98–107)
CO2 SERPL-SCNC: 22 MMOL/L (ref 21–32)
CREAT SERPL-MCNC: 1.47 MG/DL (ref 0.5–1.3)
DIGOXIN SERPL-MCNC: 0.71 NG/ML (ref 0.8–?)
EGFRCR SERPLBLD CKD-EPI 2021: 48 ML/MIN/1.73M*2
ERYTHROCYTE [DISTWIDTH] IN BLOOD BY AUTOMATED COUNT: 13.7 % (ref 11.5–14.5)
GLUCOSE BLD MANUAL STRIP-MCNC: 169 MG/DL (ref 74–99)
GLUCOSE BLDV-MCNC: 141 MG/DL (ref 74–99)
GLUCOSE SERPL-MCNC: 132 MG/DL (ref 74–99)
HCO3 BLDV-SCNC: 19.6 MMOL/L (ref 22–26)
HCT VFR BLD AUTO: 27.4 % (ref 41–52)
HCT VFR BLD EST: 33 % (ref 41–52)
HGB BLD-MCNC: 9.3 G/DL (ref 13.5–17.5)
HGB BLDV-MCNC: 11 G/DL (ref 13.5–17.5)
INHALED O2 CONCENTRATION: 28 %
INR PPP: 3.1 (ref 0.9–1.1)
LACTATE BLDV-SCNC: 1.7 MMOL/L (ref 0.4–2)
MCH RBC QN AUTO: 33.3 PG (ref 26–34)
MCHC RBC AUTO-ENTMCNC: 33.9 G/DL (ref 32–36)
MCV RBC AUTO: 98 FL (ref 80–100)
NRBC BLD-RTO: 0 /100 WBCS (ref 0–0)
OXYHGB MFR BLDV: 78.5 % (ref 45–75)
PCO2 BLDV: 31 MM HG (ref 41–51)
PH BLDV: 7.41 PH (ref 7.33–7.43)
PLATELET # BLD AUTO: 146 X10*3/UL (ref 150–450)
PO2 BLDV: 48 MM HG (ref 35–45)
POTASSIUM BLDV-SCNC: 4.2 MMOL/L (ref 3.5–5.3)
POTASSIUM SERPL-SCNC: 3.7 MMOL/L (ref 3.5–5.3)
PROTHROMBIN TIME: 35.6 SECONDS (ref 9.8–12.8)
RBC # BLD AUTO: 2.79 X10*6/UL (ref 4.5–5.9)
SAO2 % BLDV: 81 % (ref 45–75)
SODIUM BLDV-SCNC: 141 MMOL/L (ref 136–145)
SODIUM SERPL-SCNC: 141 MMOL/L (ref 136–145)
WBC # BLD AUTO: 5.9 X10*3/UL (ref 4.4–11.3)

## 2025-02-16 PROCEDURE — 84295 ASSAY OF SERUM SODIUM: CPT | Performed by: INTERNAL MEDICINE

## 2025-02-16 PROCEDURE — 2500000001 HC RX 250 WO HCPCS SELF ADMINISTERED DRUGS (ALT 637 FOR MEDICARE OP): Performed by: INTERNAL MEDICINE

## 2025-02-16 PROCEDURE — 2500000004 HC RX 250 GENERAL PHARMACY W/ HCPCS (ALT 636 FOR OP/ED): Performed by: STUDENT IN AN ORGANIZED HEALTH CARE EDUCATION/TRAINING PROGRAM

## 2025-02-16 PROCEDURE — 85610 PROTHROMBIN TIME: CPT

## 2025-02-16 PROCEDURE — 99222 1ST HOSP IP/OBS MODERATE 55: CPT | Performed by: STUDENT IN AN ORGANIZED HEALTH CARE EDUCATION/TRAINING PROGRAM

## 2025-02-16 PROCEDURE — 1200000002 HC GENERAL ROOM WITH TELEMETRY DAILY

## 2025-02-16 PROCEDURE — 82947 ASSAY GLUCOSE BLOOD QUANT: CPT

## 2025-02-16 PROCEDURE — 99223 1ST HOSP IP/OBS HIGH 75: CPT | Performed by: INTERNAL MEDICINE

## 2025-02-16 PROCEDURE — 85027 COMPLETE CBC AUTOMATED: CPT | Performed by: INTERNAL MEDICINE

## 2025-02-16 PROCEDURE — 84484 ASSAY OF TROPONIN QUANT: CPT

## 2025-02-16 PROCEDURE — 93005 ELECTROCARDIOGRAM TRACING: CPT

## 2025-02-16 PROCEDURE — 36415 COLL VENOUS BLD VENIPUNCTURE: CPT

## 2025-02-16 PROCEDURE — 80162 ASSAY OF DIGOXIN TOTAL: CPT | Performed by: STUDENT IN AN ORGANIZED HEALTH CARE EDUCATION/TRAINING PROGRAM

## 2025-02-16 PROCEDURE — 2500000002 HC RX 250 W HCPCS SELF ADMINISTERED DRUGS (ALT 637 FOR MEDICARE OP, ALT 636 FOR OP/ED): Performed by: INTERNAL MEDICINE

## 2025-02-16 PROCEDURE — 80048 BASIC METABOLIC PNL TOTAL CA: CPT

## 2025-02-16 PROCEDURE — 2500000001 HC RX 250 WO HCPCS SELF ADMINISTERED DRUGS (ALT 637 FOR MEDICARE OP): Performed by: STUDENT IN AN ORGANIZED HEALTH CARE EDUCATION/TRAINING PROGRAM

## 2025-02-16 PROCEDURE — 36415 COLL VENOUS BLD VENIPUNCTURE: CPT | Performed by: INTERNAL MEDICINE

## 2025-02-16 PROCEDURE — 2500000004 HC RX 250 GENERAL PHARMACY W/ HCPCS (ALT 636 FOR OP/ED): Performed by: INTERNAL MEDICINE

## 2025-02-16 RX ORDER — FINASTERIDE 5 MG/1
5 TABLET, FILM COATED ORAL NIGHTLY
Status: DISCONTINUED | OUTPATIENT
Start: 2025-02-16 | End: 2025-02-17 | Stop reason: HOSPADM

## 2025-02-16 RX ORDER — WARFARIN 2.5 MG/1
2.5 TABLET ORAL DAILY
Status: DISCONTINUED | OUTPATIENT
Start: 2025-02-16 | End: 2025-02-17 | Stop reason: HOSPADM

## 2025-02-16 RX ORDER — ASPIRIN 81 MG/1
81 TABLET ORAL 3 TIMES WEEKLY
Status: DISCONTINUED | OUTPATIENT
Start: 2025-02-17 | End: 2025-02-17 | Stop reason: HOSPADM

## 2025-02-16 RX ORDER — DAPAGLIFLOZIN 5 MG/1
5 TABLET, FILM COATED ORAL EVERY 24 HOURS
Status: DISCONTINUED | OUTPATIENT
Start: 2025-02-16 | End: 2025-02-17 | Stop reason: HOSPADM

## 2025-02-16 RX ORDER — SACUBITRIL AND VALSARTAN 24; 26 MG/1; MG/1
1 TABLET, FILM COATED ORAL 2 TIMES DAILY
Status: DISCONTINUED | OUTPATIENT
Start: 2025-02-16 | End: 2025-02-17 | Stop reason: HOSPADM

## 2025-02-16 RX ORDER — WARFARIN 2.5 MG/1
2.5 TABLET ORAL
COMMUNITY
Start: 2025-02-05

## 2025-02-16 RX ORDER — LEVETIRACETAM 500 MG/1
500 TABLET ORAL EVERY MORNING
Status: DISCONTINUED | OUTPATIENT
Start: 2025-02-16 | End: 2025-02-17 | Stop reason: HOSPADM

## 2025-02-16 RX ORDER — EPLERENONE 25 MG/1
25 TABLET ORAL DAILY
Status: DISCONTINUED | OUTPATIENT
Start: 2025-02-16 | End: 2025-02-17 | Stop reason: HOSPADM

## 2025-02-16 RX ORDER — ACETAMINOPHEN 325 MG/1
650 TABLET ORAL EVERY 4 HOURS PRN
Status: DISCONTINUED | OUTPATIENT
Start: 2025-02-16 | End: 2025-02-17 | Stop reason: HOSPADM

## 2025-02-16 RX ORDER — FUROSEMIDE 10 MG/ML
20 INJECTION INTRAMUSCULAR; INTRAVENOUS 2 TIMES DAILY
Status: DISCONTINUED | OUTPATIENT
Start: 2025-02-16 | End: 2025-02-17 | Stop reason: HOSPADM

## 2025-02-16 RX ORDER — SIMVASTATIN 20 MG/1
40 TABLET, FILM COATED ORAL NIGHTLY
Status: DISCONTINUED | OUTPATIENT
Start: 2025-02-16 | End: 2025-02-17 | Stop reason: HOSPADM

## 2025-02-16 RX ORDER — LEVETIRACETAM 500 MG/1
750 TABLET ORAL EVERY EVENING
COMMUNITY
Start: 2015-08-06

## 2025-02-16 RX ORDER — PANTOPRAZOLE SODIUM 40 MG/1
40 TABLET, DELAYED RELEASE ORAL
Status: DISCONTINUED | OUTPATIENT
Start: 2025-02-16 | End: 2025-02-17 | Stop reason: HOSPADM

## 2025-02-16 RX ORDER — DIGOXIN 125 MCG
125 TABLET ORAL DAILY
Status: DISCONTINUED | OUTPATIENT
Start: 2025-02-16 | End: 2025-02-17 | Stop reason: HOSPADM

## 2025-02-16 RX ORDER — FUROSEMIDE 40 MG/1
40 TABLET ORAL DAILY
Status: DISCONTINUED | OUTPATIENT
Start: 2025-02-16 | End: 2025-02-16

## 2025-02-16 RX ORDER — FUROSEMIDE 10 MG/ML
20 INJECTION INTRAMUSCULAR; INTRAVENOUS EVERY 12 HOURS
Status: DISCONTINUED | OUTPATIENT
Start: 2025-02-16 | End: 2025-02-16

## 2025-02-16 RX ORDER — CARVEDILOL 3.12 MG/1
6.25 TABLET ORAL 2 TIMES DAILY
Status: DISCONTINUED | OUTPATIENT
Start: 2025-02-16 | End: 2025-02-17 | Stop reason: HOSPADM

## 2025-02-16 RX ORDER — TORSEMIDE 10 MG/1
5 TABLET ORAL DAILY
Status: DISCONTINUED | OUTPATIENT
Start: 2025-02-16 | End: 2025-02-17 | Stop reason: HOSPADM

## 2025-02-16 RX ORDER — LEVETIRACETAM 500 MG/1
750 TABLET ORAL NIGHTLY
Status: DISCONTINUED | OUTPATIENT
Start: 2025-02-16 | End: 2025-02-17 | Stop reason: HOSPADM

## 2025-02-16 RX ADMIN — DAPAGLIFLOZIN 5 MG: 5 TABLET, FILM COATED ORAL at 13:49

## 2025-02-16 RX ADMIN — FINASTERIDE 5 MG: 5 TABLET, FILM COATED ORAL at 22:52

## 2025-02-16 RX ADMIN — PANTOPRAZOLE SODIUM 40 MG: 40 TABLET, DELAYED RELEASE ORAL at 11:00

## 2025-02-16 RX ADMIN — CARVEDILOL 6.25 MG: 3.12 TABLET, FILM COATED ORAL at 11:00

## 2025-02-16 RX ADMIN — SIMVASTATIN 40 MG: 20 TABLET, FILM COATED ORAL at 22:52

## 2025-02-16 RX ADMIN — CARVEDILOL 6.25 MG: 3.12 TABLET, FILM COATED ORAL at 21:55

## 2025-02-16 RX ADMIN — LEVETIRACETAM 500 MG: 500 TABLET, FILM COATED ORAL at 11:04

## 2025-02-16 RX ADMIN — DIGOXIN 125 MCG: 125 TABLET ORAL at 11:04

## 2025-02-16 RX ADMIN — EPLERENONE 25 MG: 25 TABLET, FILM COATED ORAL at 11:00

## 2025-02-16 RX ADMIN — SIMVASTATIN 40 MG: 20 TABLET, FILM COATED ORAL at 04:22

## 2025-02-16 RX ADMIN — FUROSEMIDE 20 MG: 10 INJECTION, SOLUTION INTRAMUSCULAR; INTRAVENOUS at 11:00

## 2025-02-16 RX ADMIN — LEVETIRACETAM 750 MG: 500 TABLET, FILM COATED ORAL at 04:22

## 2025-02-16 RX ADMIN — LEVETIRACETAM 750 MG: 500 TABLET, FILM COATED ORAL at 21:54

## 2025-02-16 RX ADMIN — FINASTERIDE 5 MG: 5 TABLET, FILM COATED ORAL at 04:22

## 2025-02-16 RX ADMIN — FUROSEMIDE 20 MG: 10 INJECTION, SOLUTION INTRAMUSCULAR; INTRAVENOUS at 22:52

## 2025-02-16 SDOH — HEALTH STABILITY: MENTAL HEALTH: HOW OFTEN DO YOU HAVE SIX OR MORE DRINKS ON ONE OCCASION?: NEVER

## 2025-02-16 SDOH — HEALTH STABILITY: MENTAL HEALTH: HOW OFTEN DO YOU HAVE A DRINK CONTAINING ALCOHOL?: NEVER

## 2025-02-16 SDOH — ECONOMIC STABILITY: INCOME INSECURITY: IN THE PAST 12 MONTHS HAS THE ELECTRIC, GAS, OIL, OR WATER COMPANY THREATENED TO SHUT OFF SERVICES IN YOUR HOME?: NO

## 2025-02-16 SDOH — ECONOMIC STABILITY: FOOD INSECURITY: HOW HARD IS IT FOR YOU TO PAY FOR THE VERY BASICS LIKE FOOD, HOUSING, MEDICAL CARE, AND HEATING?: NOT VERY HARD

## 2025-02-16 SDOH — SOCIAL STABILITY: SOCIAL INSECURITY: WITHIN THE LAST YEAR, HAVE YOU BEEN HUMILIATED OR EMOTIONALLY ABUSED IN OTHER WAYS BY YOUR PARTNER OR EX-PARTNER?: NO

## 2025-02-16 SDOH — ECONOMIC STABILITY: FOOD INSECURITY: WITHIN THE PAST 12 MONTHS, YOU WORRIED THAT YOUR FOOD WOULD RUN OUT BEFORE YOU GOT THE MONEY TO BUY MORE.: NEVER TRUE

## 2025-02-16 SDOH — SOCIAL STABILITY: SOCIAL INSECURITY: WITHIN THE LAST YEAR, HAVE YOU BEEN AFRAID OF YOUR PARTNER OR EX-PARTNER?: NO

## 2025-02-16 SDOH — SOCIAL STABILITY: SOCIAL INSECURITY: ABUSE: ADULT

## 2025-02-16 SDOH — ECONOMIC STABILITY: HOUSING INSECURITY: IN THE PAST 12 MONTHS, HOW MANY TIMES HAVE YOU MOVED WHERE YOU WERE LIVING?: 0

## 2025-02-16 SDOH — ECONOMIC STABILITY: TRANSPORTATION INSECURITY: IN THE PAST 12 MONTHS, HAS LACK OF TRANSPORTATION KEPT YOU FROM MEDICAL APPOINTMENTS OR FROM GETTING MEDICATIONS?: NO

## 2025-02-16 SDOH — HEALTH STABILITY: MENTAL HEALTH: HOW MANY DRINKS CONTAINING ALCOHOL DO YOU HAVE ON A TYPICAL DAY WHEN YOU ARE DRINKING?: PATIENT DOES NOT DRINK

## 2025-02-16 SDOH — SOCIAL STABILITY: SOCIAL INSECURITY: HAVE YOU HAD ANY THOUGHTS OF HARMING ANYONE ELSE?: NO

## 2025-02-16 SDOH — ECONOMIC STABILITY: FOOD INSECURITY: WITHIN THE PAST 12 MONTHS, THE FOOD YOU BOUGHT JUST DIDN'T LAST AND YOU DIDN'T HAVE MONEY TO GET MORE.: NEVER TRUE

## 2025-02-16 SDOH — SOCIAL STABILITY: SOCIAL INSECURITY: ARE YOU OR HAVE YOU BEEN THREATENED OR ABUSED PHYSICALLY, EMOTIONALLY, OR SEXUALLY BY ANYONE?: NO

## 2025-02-16 SDOH — SOCIAL STABILITY: SOCIAL INSECURITY: HAS ANYONE EVER THREATENED TO HURT YOUR FAMILY OR YOUR PETS?: NO

## 2025-02-16 SDOH — ECONOMIC STABILITY: HOUSING INSECURITY: IN THE LAST 12 MONTHS, WAS THERE A TIME WHEN YOU WERE NOT ABLE TO PAY THE MORTGAGE OR RENT ON TIME?: NO

## 2025-02-16 SDOH — ECONOMIC STABILITY: HOUSING INSECURITY: AT ANY TIME IN THE PAST 12 MONTHS, WERE YOU HOMELESS OR LIVING IN A SHELTER (INCLUDING NOW)?: NO

## 2025-02-16 SDOH — SOCIAL STABILITY: SOCIAL INSECURITY: DO YOU FEEL ANYONE HAS EXPLOITED OR TAKEN ADVANTAGE OF YOU FINANCIALLY OR OF YOUR PERSONAL PROPERTY?: NO

## 2025-02-16 SDOH — SOCIAL STABILITY: SOCIAL INSECURITY: WERE YOU ABLE TO COMPLETE ALL THE BEHAVIORAL HEALTH SCREENINGS?: NO

## 2025-02-16 SDOH — SOCIAL STABILITY: SOCIAL INSECURITY: ARE THERE ANY APPARENT SIGNS OF INJURIES/BEHAVIORS THAT COULD BE RELATED TO ABUSE/NEGLECT?: NO

## 2025-02-16 SDOH — SOCIAL STABILITY: SOCIAL INSECURITY: DO YOU FEEL UNSAFE GOING BACK TO THE PLACE WHERE YOU ARE LIVING?: NO

## 2025-02-16 SDOH — SOCIAL STABILITY: SOCIAL INSECURITY: HAVE YOU HAD THOUGHTS OF HARMING ANYONE ELSE?: NO

## 2025-02-16 SDOH — SOCIAL STABILITY: SOCIAL INSECURITY: DOES ANYONE TRY TO KEEP YOU FROM HAVING/CONTACTING OTHER FRIENDS OR DOING THINGS OUTSIDE YOUR HOME?: NO

## 2025-02-16 ASSESSMENT — ACTIVITIES OF DAILY LIVING (ADL)
FEEDING YOURSELF: INDEPENDENT
WALKS IN HOME: INDEPENDENT
ADEQUATE_TO_COMPLETE_ADL: NO
JUDGMENT_ADEQUATE_SAFELY_COMPLETE_DAILY_ACTIVITIES: YES
ASSISTIVE_DEVICE: DENTURES LOWER;DENTURES UPPER;EYEGLASSES
HEARING - LEFT EAR: FUNCTIONAL
DRESSING YOURSELF: INDEPENDENT
WALKS IN HOME: INDEPENDENT
DRESSING YOURSELF: INDEPENDENT
GROOMING: INDEPENDENT
HEARING - RIGHT EAR: FUNCTIONAL
BATHING: NEEDS ASSISTANCE
BATHING: NEEDS ASSISTANCE
PATIENT'S MEMORY ADEQUATE TO SAFELY COMPLETE DAILY ACTIVITIES?: YES
LACK_OF_TRANSPORTATION: NO
PATIENT'S MEMORY ADEQUATE TO SAFELY COMPLETE DAILY ACTIVITIES?: YES
TOILETING: INDEPENDENT
GROOMING: INDEPENDENT
HEARING - LEFT EAR: FUNCTIONAL
JUDGMENT_ADEQUATE_SAFELY_COMPLETE_DAILY_ACTIVITIES: YES
TOILETING: INDEPENDENT
LACK_OF_TRANSPORTATION: NO
ASSISTIVE_DEVICE: DENTURES UPPER;DENTURES LOWER;EYEGLASSES
ADEQUATE_TO_COMPLETE_ADL: YES
FEEDING YOURSELF: INDEPENDENT
HEARING - RIGHT EAR: FUNCTIONAL

## 2025-02-16 ASSESSMENT — COGNITIVE AND FUNCTIONAL STATUS - GENERAL
MOBILITY SCORE: 24
DAILY ACTIVITIY SCORE: 24
PATIENT BASELINE BEDBOUND: NO

## 2025-02-16 ASSESSMENT — PAIN SCALES - GENERAL: PAINLEVEL_OUTOF10: 0 - NO PAIN

## 2025-02-16 ASSESSMENT — ENCOUNTER SYMPTOMS
NAUSEA: 0
CONFUSION: 0
WOUND: 0
FEVER: 0
HEMATURIA: 0
SORE THROAT: 0
MYALGIAS: 0
DIZZINESS: 0
ABDOMINAL PAIN: 0
PALPITATIONS: 0
DIARRHEA: 0
APPETITE CHANGE: 0
VOMITING: 0
DIFFICULTY URINATING: 0
HALLUCINATIONS: 0
COUGH: 1
CONSTIPATION: 0
HEADACHES: 0
SHORTNESS OF BREATH: 1
WHEEZING: 0
DYSURIA: 0
CHILLS: 0

## 2025-02-16 ASSESSMENT — LIFESTYLE VARIABLES
HOW MANY STANDARD DRINKS CONTAINING ALCOHOL DO YOU HAVE ON A TYPICAL DAY: PATIENT DOES NOT DRINK
SKIP TO QUESTIONS 9-10: 1
AUDIT-C TOTAL SCORE: 0
AUDIT-C TOTAL SCORE: 0
SKIP TO QUESTIONS 9-10: 1
SKIP TO QUESTIONS 9-10: 1
HOW OFTEN DO YOU HAVE 6 OR MORE DRINKS ON ONE OCCASION: NEVER
AUDIT-C TOTAL SCORE: 0
AUDIT-C TOTAL SCORE: 0
HOW OFTEN DO YOU HAVE A DRINK CONTAINING ALCOHOL: NEVER

## 2025-02-16 ASSESSMENT — PATIENT HEALTH QUESTIONNAIRE - PHQ9
1. LITTLE INTEREST OR PLEASURE IN DOING THINGS: NOT AT ALL
2. FEELING DOWN, DEPRESSED OR HOPELESS: NOT AT ALL
SUM OF ALL RESPONSES TO PHQ9 QUESTIONS 1 & 2: 0

## 2025-02-16 ASSESSMENT — PAIN - FUNCTIONAL ASSESSMENT: PAIN_FUNCTIONAL_ASSESSMENT: 0-10

## 2025-02-16 NOTE — ED TRIAGE NOTES
PT arrives from home with c/o increassing SOB that gets worse with exertion. PT also c/o BL ankle swelling that has also been getting worse.

## 2025-02-16 NOTE — ED PROVIDER NOTES
HPI   Chief Complaint   Patient presents with    Shortness of Breath       79-year-old male with a PMH significant for HFrEF, sick sinus syndrome s/p pacemaker, Non-small cell lung cancer, Paroxysmal A-fib (on Coumadin), CKD, seizure disorder, and HTN presenting to the ED for shortness of breath x 3 days.  Patient states he has had increasing exertional dyspnea. Additionally reports a dry cough and bilateral ankle swelling. Denies any fevers, chills, lightheadedness, dizziness, nasal congestion, rhinorrhea, sore throat, hemoptysis, chest pain, palpitations, abdominal pain, nausea, vomiting, diarrhea, constipation, urinary symptoms, weakness, numbness/tingling.  Patient reports he lives with his wife at home.  Denies any difficulty with ambulation, recent falls, or injuries.                Patient History   Past Medical History:   Diagnosis Date    Personal history of other diseases of the circulatory system 05/05/2015    History of ventricular tachycardia    Personal history of other diseases of the digestive system     History of hemorrhoids    Personal history of other infectious and parasitic diseases     History of chickenpox    Personal history of other infectious and parasitic diseases     History of measles    Personal history of other infectious and parasitic diseases     History of mumps    Personal history of pneumonia (recurrent)     History of pneumonia    Personal history of transient ischemic attack (TIA), and cerebral infarction without residual deficits     History of stroke     Past Surgical History:   Procedure Laterality Date    CARDIAC PACEMAKER PLACEMENT  12/21/2017    Pacemaker Placement    CHOLECYSTECTOMY  12/21/2017    Cholecystectomy Laparoscopic    COLECTOMY  12/21/2017    Partial Colectomy     No family history on file.  Social History     Tobacco Use    Smoking status: Never    Smokeless tobacco: Never   Vaping Use    Vaping status: Never Used   Substance Use Topics    Alcohol use: Not on  file    Drug use: Not on file       Physical Exam   ED Triage Vitals [02/15/25 2002]   Temperature Heart Rate Respirations BP   36.3 °C (97.3 °F) 89 20 124/70      Pulse Ox Temp Source Heart Rate Source Patient Position   97 % Temporal Monitor Sitting      BP Location FiO2 (%)     Right arm --       Physical Exam  Vitals and nursing note reviewed.   Constitutional:       General: He is not in acute distress.     Appearance: He is not ill-appearing or toxic-appearing.   HENT:      Head: Normocephalic and atraumatic.      Nose: Nose normal.      Mouth/Throat:      Mouth: Mucous membranes are moist.      Pharynx: Oropharynx is clear.   Eyes:      Extraocular Movements: Extraocular movements intact.      Conjunctiva/sclera: Conjunctivae normal.      Pupils: Pupils are equal, round, and reactive to light.   Cardiovascular:      Rate and Rhythm: Normal rate and regular rhythm.   Pulmonary:      Effort: Pulmonary effort is normal. No accessory muscle usage or respiratory distress.      Breath sounds: Normal breath sounds.   Abdominal:      General: Abdomen is flat. Bowel sounds are normal.      Palpations: Abdomen is soft.      Tenderness: There is no abdominal tenderness. There is no guarding or rebound.   Musculoskeletal:         General: Normal range of motion.      Cervical back: Normal range of motion and neck supple. No rigidity.      Comments: Minimal edema present in bilateral ankles.  No erythema or calf swelling. Compartments are soft and non-tender. Sensation intact. DP and PT 2+ bilaterally.   Skin:     General: Skin is warm and dry.      Capillary Refill: Capillary refill takes less than 2 seconds.   Neurological:      General: No focal deficit present.      Mental Status: He is alert and oriented to person, place, and time.   Psychiatric:         Mood and Affect: Mood normal.           ED Course & MDM   Diagnoses as of 02/16/25 0322   Shortness of breath   Acute on chronic heart failure with reduced ejection  fraction (HFrEF, <= 40%)   Hypoxia                 No data recorded     Kittrell Coma Scale Score: 15 (02/15/25 2005 : Eva Brandon RN)                           Medical Decision Making  79-year-old male with a PMH significant for HFrEF, sick sinus syndrome s/p pacemaker, Non-small cell lung cancer, Paroxysmal A-fib (on Coumadin), CKD, seizure disorder, and HTN presenting to the ED for shortness of breath, dyspnea on exertion, leg edema x 3 days.  Additionally reported a dry cough but denies any other systemic symptoms.  Patient is afebrile, without tachycardia, or hypoxia on arrival.  He is overall well-appearing.  He is nontoxic in appearance.  Lungs are clear to auscultation bilaterally. Abdomen is soft, nontender, with equal bowel sounds throughout.  There is slight edema to the bilateral ankles.  No unilateral calf erythema or swelling concerning for DVT. PERC applied, low suspicion for PE as well. Patient states he been wearing his compression stockings but having difficulty putting them on and taking them off due to increasing shortness of breath.  EKG showing ventricular paced rhythm, rate 85, , QRS 76, QTc 550.  No significant changes from prior EKG on 11/10/24.  Initial high-sensitivity troponin 26, delta 30.  CBC without leukocytosis, chronic anemia similar to patient patient's baseline.  CMP with elevated bun 30, creatinine 1.61, GFR 43 in the presence of CKD, does not appear to have an AMALIA. Mag WNL. BNP elevated at 2805, patient is likely experiencing acute on chronic HFrEF exacerbation with increasing SOB and leg edema. CXR remarkable for mild pulmonary edema and small right pleural effusion. No consolidations concerning for underlying pneumonia. Covid, RSV, and Flu A/B negative.  Patient's SpO2 dropped to 88-90% on RA and was placed on 2 L NC. 20 mg Lasix administered. Patient's family were informed of all lab and imaging findings, all questions and concerns were answered. Patient case  discussed with the hospitalist who agreed to observation admission.  Patient was agreeable to plan of care and admission.        Procedure  Procedures     Ela Ness PA-C  02/16/25 0323

## 2025-02-16 NOTE — PROGRESS NOTES
"Ernst Clark is a 79 y.o. male on day 0 of admission presenting with Acute on chronic systolic heart failure.        Subjective   Seen this AM.  Resting comfortably.  No changes to initial plan.        Objective     Last Recorded Vitals  /59   Pulse 80   Temp 36.3 °C (97.3 °F) (Temporal)   Resp 16   Ht 1.626 m (5' 4\")   Wt 68 kg (150 lb)   SpO2 98%   BMI 25.75 kg/m²     Intake/Output last 3 Shifts:  No intake/output data recorded.      =========    RELEVANT RESULTS      ==========    Labs  Lab Results   Component Value Date    WBC 5.9 02/16/2025    HGB 9.3 (L) 02/16/2025    HCT 27.4 (L) 02/16/2025    MCV 98 02/16/2025     (L) 02/16/2025     Lab Results   Component Value Date    GLUCOSE 132 (H) 02/16/2025    CALCIUM 8.6 02/16/2025     02/16/2025    K 3.7 02/16/2025    CO2 22 02/16/2025     02/16/2025    BUN 29 (H) 02/16/2025    CREATININE 1.47 (H) 02/16/2025      Lab Results   Component Value Date    ALT 14 02/15/2025    AST 19 02/15/2025    ALKPHOS 70 02/15/2025    BILITOT 1.1 02/15/2025          Exam     GENERAL:   no distress, alert and cooperative  HEENT: Normal Inspection, Mucous membranes moist, No JVD, No Lymphadenopathy  CARDIOVASCULAR: RRR, no murmurs, 2+ equal pulses of the extremities, normal S1 and S 2  RESPIRATORY: Patent airways, CTAB, thorax symmetric, No significant wheezing, Rales or Rhonchi  ABDOMEN: Soft, Non-Tender, Normal Bowel Sounds, No Distention  SKIN: Warm and dry, no lesions, no rashes  EXTREMITIES: normal extremities, no significant cyanosis edema, contusions or wounds, no obsvious clubbing  NEURO: A&O x 3, CN II-XII grossly intact  PSYCH: Appropriate mood and behavior        =======     SCHEDULED MEDICATIONS     =======  Scheduled medications   Medication Dose Route Frequency    [START ON 2/17/2025] aspirin  81 mg oral Once per day on Monday Wednesday Friday    carvedilol  6.25 mg oral BID    digoxin  125 mcg oral Daily    eplerenone  25 mg oral Daily    " finasteride  5 mg oral Nightly    furosemide  20 mg intravenous q12h    levETIRAcetam  500 mg oral q AM    levETIRAcetam  750 mg oral Nightly    pantoprazole  40 mg oral Daily before breakfast    [Held by provider] sacubitriL-valsartan  1 tablet oral BID    simvastatin  40 mg oral Nightly    [Held by provider] torsemide  5 mg oral Daily    [Held by provider] warfarin  2.5 mg oral Daily       ==========     PRN MEDICATIONS      =========  acetaminophen, 650 mg, q4h PRN        ==============     DIET     ==============  Dietary Orders (From admission, onward)       Start     Ordered    02/16/25 0600  Adult diet Regular, Cardiac; 70 gm fat; 2 - 3 grams Sodium  Diet effective now        Question Answer Comment   Diet type Regular    Diet type Cardiac    Fat restriction: 70 gm fat    Sodium restriction: 2 - 3 grams Sodium        02/16/25 0559                    ======    Assessment/Plan      =======    ASSESSMENT:  Assessment & Plan  Acute on chronic systolic heart failure    Shortness of breath    ___________________________________________________    # Acute on chronic systolic CHF  # Hypoxia  # AMALIA vs CKD3  A-fib on warfarin  Moderate mitral regurgitation  HTN  Seizure disorder  BPH  GERD              PLAN:    Cont. IV lasix 20 mg BID for now.   hold dose of Entresto as his creatinine is above his baseline.   INR 3.1 on admit - held coumadin dose.    aspirin, simvastatin, coreg.   Resume home medications.  Wean oxygen as tolerated  Cardiology consulted.         DVT Prophylaxis  Last Anticoag Admin            Orders not given:    warfarin (Coumadin) tablet 2.5 mg              Disclaimer: The timestamp of this note indicates when it was documented and does not necessarily correspond to the time the patient was evaluated or seen.        Rylan Lazcano DO

## 2025-02-16 NOTE — CONSULTS
Consults  History Of Present Illness:    Ernst Clark is a 79 y.o. male past with history notable for dilated nonischemic cardiomyopathy/HFrEF/EF of 15 to 20% status post CRT-D, paroxysmal atrial fibrillation on Coumadin, sick sinus syndrome status post pacemaker, seizure disorder, type 2 diabetes, history of non-small cell lung cancer status post radiation therapy admitted to the hospital with 3 days of shortness of breath.  Patient complains of increased shortness of breath for the last 3 days.  He has had swelling in his ankles with mild cough.  No fevers or chills.  No chest pain.  Vital signs in the ED were unremarkable.  Initial labs notable for BNP of 2805 and troponin of 26.  Creatinine of 1.61 and BUN of 30.  Macrocytic anemia with a hemoglobin of 10.3.  Flu and COVID-negative.  Chest x-ray notable for enlarged cardiac silhouette with small right-sided pleural effusion.  Initial ECG notable for atrial sensed ventricular paced rhythm.  Most recent echo from 11/10/2024 showed severely depressed LV function 15 to 20% moderate MR severely dilated right atria.  Of note patient was hospitalized in November this year for sudden onset of shortness of breath and heart failure.  He was diuresed and discharged.  Patient received IV Lasix 20 mg in the ED and was admitted to the floor.    Last Recorded Vitals:  Vitals:    02/16/25 0625 02/16/25 0700 02/16/25 0800 02/16/25 0900   BP: 113/61 112/58 113/58 108/59   Patient Position: Lying      Pulse: 78 80 80 80   Resp: 18 16 16 16   Temp:       TempSrc:       SpO2: 95% 98% 98% 98%   Weight:       Height:           Last Labs:  CBC - 2/16/2025:  5:01 AM  5.9 9.3 146    27.4      CMP - 2/16/2025:  5:01 AM  8.6 7.0 19 --- 1.1   _ 4.0 14 70      PTT - 11/10/2024:  1:10 AM  3.1   35.6 36     Troponin I, High Sensitivity   Date/Time Value Ref Range Status   02/16/2025 12:16 AM 30 (H) 0 - 20 ng/L Final   02/15/2025 08:49 PM 26 (H) 0 - 20 ng/L Final   11/10/2024 02:16 AM 29 (H) 0 -  20 ng/L Final     BNP   Date/Time Value Ref Range Status   02/15/2025 08:49 PM 2,805 (H) 0 - 99 pg/mL Final   11/10/2024 01:10 AM 4,296 (H) 0 - 99 pg/mL Final     Hemoglobin A1C   Date/Time Value Ref Range Status   11/10/2024 01:10 AM 6.3 (H) See comment % Final   03/22/2024 09:09 AM 6.2 (H) 4.3 - 5.6 % Final     Comment:     American Diabetes Association guidelines indicate that patients with HgbA1c in the range 5.7-6.4% are at increased risk for development of diabetes, and intervention by lifestyle modification may be beneficial. HgbA1c greater or equal to 6.5% is considered diagnostic of diabetes.   04/25/2023 10:06 AM 6.1 (H) 4.3 - 5.6 % Final     Comment:     American Diabetes Association guidelines indicate that patients with HgbA1c in the range 5.7-6.4% are at increased risk for development of diabetes, and intervention by lifestyle modification may be beneficial. HgbA1c greater or equal to 6.5% is considered diagnostic of diabetes.     POCT Hemoglobin A1C   Date/Time Value Ref Range Status   09/11/2024 10:34 AM 6.2 (A) 4.3 - 5.6 % Final     Comment:     Location:[address], [place], [place],   [facility]  Point of care (POC) Hemoglobin A1c (HGBA1C) testing is intended to assess  glucose control and provide a management tool for patients known to have  diabetes and their healthcare providers.  Target HGBA1C levels may depend on  specific clinical circumstances.  POC HGBA1C is not intended for use as a  diagnostic or screening test; laboratory-based testing should be used for  diagnostic purposes.  The following information is supplemental and may not  be applicable to specific diabetes management situations:  The POC device   provides a normal range of 4.2% to 6.5% for the HGBA1C POC test.  However, the American Diabetes Association  guidelines indicate that  patients with HGBA1C in the range of 5.7% to 6.4% are at increased risk for  development of diabetes and  that intervention by lifestyle modification may  be beneficial.  A HGBA1C level greater than or equal to 6.5% is considered  diagnostic of diabetes, pending confirmatory testing.  Use of HGBA1C testing  to evaluate glucose control may not be appropriate for patients with  hemoglobin variants or other conditions (e.g. anemia) that alter red blood  cell lifespan.   10/31/2023 10:07 AM 6.4 4.2 - 5.6 % Final     Comment:     Location:[facility], [address] [place] [address], [place], [place],   [address]  Point of care (POC) Hemoglobin A1c (HGBA1C) testing is intended to assess  glucose control and provide a management tool for patients known to have  diabetes and their healthcare providers.  Target HGBA1C levels may depend on  specific clinical circumstances.  POC HGBA1C is not intended for use as a  diagnostic or screening test; laboratory-based testing should be used for  diagnostic purposes.  The following information is supplemental and may not  be applicable to specific diabetes management situations:  The POC device   provides a normal range of 4.2% to 6.5% for the HGBA1C POC test.  However, the American Diabetes Association  guidelines indicate that  patients with HGBA1C in the range of 5.7% to 6.4% are at increased risk for  development of diabetes and that intervention by lifestyle modification may  be beneficial.  A HGBA1C level greater than or equal to 6.5% is considered  diagnostic of diabetes, pending confirmatory testing.  Use of HGBA1C testing  to evaluate glucose control may not be appropriate for patients with  hemoglobin variants or other conditions (e.g. anemia) that alter red blood  cell lifespan.      Last I/O:  No intake/output data recorded.    Past Cardiology Tests (Last 3 Years):  EKG:  ECG 12 Lead 11/10/2024    Echo:  Transthoracic Echo (TTE) Complete 11/11/2024    Ejection Fractions:  EF   Date/Time Value Ref Range Status   11/11/2024 10:43 AM 18 %      Cath:  No results  found for this or any previous visit from the past 1095 days.    Stress Test:  No results found for this or any previous visit from the past 1095 days.    Cardiac Imaging:  No results found for this or any previous visit from the past 1095 days.      Past Medical History:  He has a past medical history of Personal history of other diseases of the circulatory system (05/05/2015), Personal history of other diseases of the digestive system, Personal history of other infectious and parasitic diseases, Personal history of other infectious and parasitic diseases, Personal history of other infectious and parasitic diseases, Personal history of pneumonia (recurrent), and Personal history of transient ischemic attack (TIA), and cerebral infarction without residual deficits.    Past Surgical History:  He has a past surgical history that includes Cholecystectomy (12/21/2017); Cardiac pacemaker placement (12/21/2017); and Colectomy (12/21/2017).      Social History:  He reports that he has never smoked. He has never used smokeless tobacco. No history on file for alcohol use and drug use.    Family History:  No family history on file.     Allergies:  Morphine, Adalat, and Empagliflozin    Inpatient Medications:  Scheduled medications   Medication Dose Route Frequency    [START ON 2/17/2025] aspirin  81 mg oral Once per day on Monday Wednesday Friday    carvedilol  6.25 mg oral BID    digoxin  125 mcg oral Daily    eplerenone  25 mg oral Daily    finasteride  5 mg oral Nightly    furosemide  20 mg intravenous q12h    levETIRAcetam  500 mg oral q AM    levETIRAcetam  750 mg oral Nightly    pantoprazole  40 mg oral Daily before breakfast    [Held by provider] sacubitriL-valsartan  1 tablet oral BID    simvastatin  40 mg oral Nightly    [Held by provider] torsemide  5 mg oral Daily    [Held by provider] warfarin  2.5 mg oral Daily     PRN medications   Medication    acetaminophen     Continuous Medications   Medication Dose Last  Rate     Outpatient Medications:  Current Outpatient Medications   Medication Instructions    acetaminophen (TYLENOL 8 HOUR) 650 mg, Every 4 hours PRN    aspirin 81 mg, 3 times weekly    carvedilol (COREG) 6.25 mg, 2 times daily    digoxin (LANOXIN) 125 mcg, 6 times weekly    eplerenone (INSPRA) 25 mg, Daily    finasteride (PROSCAR) 5 mg, Nightly    levETIRAcetam (KEPPRA) 500 mg, Every morning    levETIRAcetam (KEPPRA) 750 mg, oral, Every evening    omeprazole (PRILOSEC) 20 mg, Daily before breakfast    sacubitriL-valsartan (Entresto) 24-26 mg tablet 1 tablet, 2 times daily    simvastatin (ZOCOR) 40 mg, Nightly    torsemide (DEMADEX) 5 mg, Daily    Trulicity 1.5 mg, Weekly    warfarin (COUMADIN) 3.75 mg, oral, Every Mon/Wed/Fri    warfarin (COUMADIN) 2.5 mg, Every Sun/Tues/Thur/Sat       Physical Exam:  General: No acute distress,  A&O x3  Skin: Warm and dry  Neck: JVD is mildly elevated  ENT: Moist mucous membranes no lesions appreciated  Pulmonary: Minimal rhonchi in lung bases  Cards: Pacer in the upper left chest wall: Regular rate rhythm, no murmurs gallops or rubs appreciated normal S1-S2  Abdomen: Soft nontender nondistended  Extremities: No edema or cyanosis  Psych: Appropriate mood and affect        Assessment/Plan     1.  Acute on chronic decompensated heart failure/nonischemic cardiomyopathy?/HFrEF: EF of 15 to 20%.  NYHA class III-IV stage C.  Status post CRT-D.  Acute on chronic renal injury.  Presenting with heart failure symptoms.  -Diurese with IV Lasix with a goal of -1 to 2 L  -Add Farxiga 5 mg   -Continue eplerenone 25 mg once a day  -Continue carvedilol 6.25 mg twice a day  -Entresto currently on hold    2.  History of paroxysmal atrial fibrillation on warfarin  -Takes digoxin for rhythm management.  Check dig levels    3.  Sick sinus syndrome status post permanent pacing     Code Status:  DNR    Chandler Robbins MD PhD

## 2025-02-16 NOTE — H&P
History Of Present Illness  Ernst Clark is a 79 y.o. male PMHx HTN, HLD, dilated cardiomyopathy, systolic heart failure EF 15-25% s/p CRT-D, SSS s/p pacemaker, PAF on coumadin, CKD 3a, seizure disorder, DM2, stage1 NSCLC of RUL s/p radiation presented to [facility] ED for shortness of breath for the past 3 days.  Shortness of breath worse with exertion and also he had swelling of his ankles.  Denies missing any doses of his medications, denies eating anything high in salt.  In the ED workup showed elevated BNP 2.8k, mild.y elevated troponin 26.  Creatinine 1.61.  CXR reviewed and showed pulmonary edema.  He was placed on 2LPM O2.       Past Medical History  Past Medical History:   Diagnosis Date    Personal history of other diseases of the circulatory system 05/05/2015    History of ventricular tachycardia    Personal history of other diseases of the digestive system     History of hemorrhoids    Personal history of other infectious and parasitic diseases     History of chickenpox    Personal history of other infectious and parasitic diseases     History of measles    Personal history of other infectious and parasitic diseases     History of mumps    Personal history of pneumonia (recurrent)     History of pneumonia    Personal history of transient ischemic attack (TIA), and cerebral infarction without residual deficits     History of stroke       Surgical History  Past Surgical History:   Procedure Laterality Date    CARDIAC PACEMAKER PLACEMENT  12/21/2017    Pacemaker Placement    CHOLECYSTECTOMY  12/21/2017    Cholecystectomy Laparoscopic    COLECTOMY  12/21/2017    Partial Colectomy        Social History  He reports that he has never smoked. He has never used smokeless tobacco. No history on file for alcohol use and drug use.    Family History  No family history on file.     Allergies  Morphine, Adalat, and Empagliflozin    Review of Systems   Constitutional:  Negative for appetite change, chills and fever.   HENT:   "Negative for congestion, ear pain and sore throat.    Eyes:  Negative for visual disturbance.   Respiratory:  Positive for cough and shortness of breath. Negative for wheezing.    Cardiovascular:  Positive for leg swelling. Negative for chest pain and palpitations.   Gastrointestinal:  Negative for abdominal pain, constipation, diarrhea, nausea and vomiting.   Genitourinary:  Negative for difficulty urinating, dysuria and hematuria.   Musculoskeletal:  Negative for myalgias.   Skin:  Negative for rash and wound.   Neurological:  Negative for dizziness, syncope and headaches.   Psychiatric/Behavioral:  Negative for confusion and hallucinations.    All other systems reviewed and are negative.       Physical Exam     GEN:  awake, alert, not in acute distress  HEENT:  normocephalic, atraumatic, PERRL, EOM intact, nares patent  Cardio:  RRR, no murmurs  Resp:  CTAB, no wheezing  Abd:  +BS, soft, nontender  :  deferred  Neuro:  A&Ox3, CN2-12 grossly intact, no focal deficits  Msk:  no gross deformities, no joint effusions  Skin:  warm, dry, intact  Psych:  appropriate in mood and behavior      Last Recorded Vitals  Blood pressure 119/61, pulse 93, temperature 36.3 °C (97.3 °F), temperature source Temporal, resp. rate (!) 26, height 1.626 m (5' 4\"), weight 68 kg (150 lb), SpO2 96%.    Relevant Results    XR chest 2 views    Result Date: 2/15/2025  Interpreted By:  Crescencio Corbett, STUDY: XR CHEST 2 VIEWS;  2/15/2025 9:03 pm   INDICATION: Signs/Symptoms:SOB.     COMPARISON: None.   ACCESSION NUMBER(S): [id number]   ORDERING CLINICIAN: PHILLY BERRY   FINDINGS: Right-sided pacemaker/AICD in place   CARDIOMEDIASTINAL SILHOUETTE: There is moderate enlargement of the cardiac silhouette. There is pulmonary congestion with mild edema.   LUNGS: There is a small right pleural effusion. Mild bibasilar airspace disease present   ABDOMEN: No remarkable upper abdominal findings.   BONES: No acute osseous changes.       Enlarged " cardiac silhouette with mild pulmonary edema. Small right pleural effusion.     MACRO: None   Signed by: Crescencio Corbett 2/15/2025 9:08 PM Dictation workstation:   [id number]     Results for orders placed or performed during the hospital encounter of 02/15/25 (from the past 24 hours)   CBC and Auto Differential   Result Value Ref Range    WBC 6.2 4.4 - 11.3 x10*3/uL    nRBC 0.0 0.0 - 0.0 /100 WBCs    RBC 3.08 (L) 4.50 - 5.90 x10*6/uL    Hemoglobin 10.3 (L) 13.5 - 17.5 g/dL    Hematocrit 30.8 (L) 41.0 - 52.0 %     80 - 100 fL    MCH 33.4 26.0 - 34.0 pg    MCHC 33.4 32.0 - 36.0 g/dL    RDW 13.8 11.5 - 14.5 %    Platelets 186 150 - 450 x10*3/uL    Neutrophils % 61.5 40.0 - 80.0 %    Immature Granulocytes %, Automated 0.3 0.0 - 0.9 %    Lymphocytes % 21.5 13.0 - 44.0 %    Monocytes % 8.7 2.0 - 10.0 %    Eosinophils % 7.4 0.0 - 6.0 %    Basophils % 0.6 0.0 - 2.0 %    Neutrophils Absolute 3.84 1.60 - 5.50 x10*3/uL    Immature Granulocytes Absolute, Automated 0.02 0.00 - 0.50 x10*3/uL    Lymphocytes Absolute 1.34 0.80 - 3.00 x10*3/uL    Monocytes Absolute 0.54 0.05 - 0.80 x10*3/uL    Eosinophils Absolute 0.46 (H) 0.00 - 0.40 x10*3/uL    Basophils Absolute 0.04 0.00 - 0.10 x10*3/uL   Comprehensive Metabolic Panel   Result Value Ref Range    Glucose 163 (H) 74 - 99 mg/dL    Sodium 139 136 - 145 mmol/L    Potassium 4.2 3.5 - 5.3 mmol/L    Chloride 104 98 - 107 mmol/L    Bicarbonate 23 21 - 32 mmol/L    Anion Gap 16 10 - 20 mmol/L    Urea Nitrogen 30 (H) 6 - 23 mg/dL    Creatinine 1.61 (H) 0.50 - 1.30 mg/dL    eGFR 43 (L) >60 mL/min/1.73m*2    Calcium 8.8 8.6 - 10.3 mg/dL    Albumin 4.0 3.4 - 5.0 g/dL    Alkaline Phosphatase 70 33 - 136 U/L    Total Protein 7.0 6.4 - 8.2 g/dL    AST 19 9 - 39 U/L    Bilirubin, Total 1.1 0.0 - 1.2 mg/dL    ALT 14 10 - 52 U/L   Magnesium   Result Value Ref Range    Magnesium 1.98 1.60 - 2.40 mg/dL   B-Type Natriuretic Peptide   Result Value Ref Range    BNP 2,805 (H) 0 - 99 pg/mL    Troponin I, High Sensitivity, Initial   Result Value Ref Range    Troponin I, High Sensitivity 26 (H) 0 - 20 ng/L   Sars-CoV-2 and Influenza A/B PCR   Result Value Ref Range    Flu A Result Not Detected Not Detected    Flu B Result Not Detected Not Detected    Coronavirus 2019, PCR Not Detected Not Detected   RSV PCR   Result Value Ref Range    RSV PCR Not Detected Not Detected   Troponin, High Sensitivity, 1 Hour   Result Value Ref Range    Troponin I, High Sensitivity 30 (H) 0 - 20 ng/L   Protime-INR   Result Value Ref Range    Protime 35.6 (H) 9.8 - 12.8 seconds    INR 3.1 (H) 0.9 - 1.1   Blood Gas Venous Full Panel   Result Value Ref Range    POCT pH, Venous 7.41 7.33 - 7.43 pH    POCT pCO2, Venous 31 (L) 41 - 51 mm Hg    POCT pO2, Venous 48 (H) 35 - 45 mm Hg    POCT SO2, Venous 81 (H) 45 - 75 %    POCT Oxy Hemoglobin, Venous 78.5 (H) 45.0 - 75.0 %    POCT Hematocrit Calculated, Venous 33.0 (L) 41.0 - 52.0 %    POCT Sodium, Venous 141 136 - 145 mmol/L    POCT Potassium, Venous 4.2 3.5 - 5.3 mmol/L    POCT Chloride, Venous 107 98 - 107 mmol/L    POCT Ionized Calicum, Venous 1.19 1.10 - 1.33 mmol/L    POCT Glucose, Venous 141 (H) 74 - 99 mg/dL    POCT Lactate, Venous 1.7 0.4 - 2.0 mmol/L    POCT Base Excess, Venous -4.2 (L) -2.0 - 3.0 mmol/L    POCT HCO3 Calculated, Venous 19.6 (L) 22.0 - 26.0 mmol/L    POCT Hemoglobin, Venous 11.0 (L) 13.5 - 17.5 g/dL    POCT Anion Gap, Venous 19.0 10.0 - 25.0 mmol/L    Patient Temperature 37.0 degrees Celsius    FiO2 28 %          Assessment/Plan   Assessment & Plan  Acute on chronic systolic heart failure    Shortness of breath      # Acute on chronic systolic CHF  # Hypoxia  # AMALIA vs CKD3  # Non troponin elevation  A-fib on warfarin  Moderate mitral regurgitation  HTN  Seizure disorder  BPH  GERD    Admit patient for acute on chronic systolic heart failure.  Received Lasix 20mg IV in the ED, currently his breathing is improving he states.    Will hold dose of Entresto as his  creatinine is above his baseline.  Will continue additional diuresis with Lasix 20mg IV BID today.  INR 3.1, hold evening dose coumadin.  Troponin elevation is due to acute on chronic heart failure, he denies any chest pain.  Continue medical therapy with aspirin, simvastatin, coreg.   Resume home medications.  Wean oxygen.       I spent 60 minutes in the professional and overall care of this patient.    Joseph Vargas DO  Senior Attending Physician  [facility]  Clinical Instructor

## 2025-02-16 NOTE — PROGRESS NOTES
Pharmacy Medication History Review    Ernst Clark is a 79 y.o. male admitted for Acute on chronic systolic heart failure. Pharmacy reviewed the patient's [id number] medications and allergies for accuracy.    The list below reflectives the updated PTA list. Please review each medication in order reconciliation for additional clarification and justification.  Prior to Admission medications    Medication Sig Start Date End Date Taking? Authorizing Provider   levETIRAcetam (Keppra) 500 mg tablet Take 1.5 tablets (750 mg) by mouth once daily in the evening. 8/6/15  Yes Historical Provider, MD   acetaminophen (Tylenol 8 HOUR) 650 mg ER tablet Take 1 tablet (650 mg) by mouth every 4 hours if needed for mild pain (1 - 3). Do not crush, chew, or split.   Yes Historical Provider, MD   aspirin 81 mg EC tablet Take 1 tablet (81 mg) by mouth 3 times a week. MW   Yes Historical Provider, MD   carvedilol (Coreg) 6.25 mg tablet Take 1 tablet (6.25 mg) by mouth 2 times a day.   Yes Historical Provider, MD   digoxin (Lanoxin) 125 MCG tablet Take 1 tablet (125 mcg) by mouth 6 times a week. Skip Friday   Yes Historical Provider, MD   dulaglutide (Trulicity) 1.5 mg/0.5 mL pen injector injection Inject 1.5 mg under the skin 1 (one) time per week. Every Saturday AM   Yes Historical Provider, MD   eplerenone (Inspra) 25 mg tablet Take 1 tablet (25 mg) by mouth once daily.   Yes Historical Provider, MD   finasteride (Proscar) 5 mg tablet Take 1 tablet (5 mg) by mouth once daily at bedtime. Do not crush, chew, or split.   Yes Historical Provider, MD   levETIRAcetam (Keppra) 500 mg tablet Take 1 tablet (500 mg) by mouth once daily in the morning.   Yes Historical Provider, MD   omeprazole (PriLOSEC) 20 mg DR capsule Take 1 capsule (20 mg) by mouth once daily in the morning. Take before meals. Do not crush or chew.   Yes Historical Provider, MD   sacubitriL-valsartan (Entresto) 24-26 mg tablet Take 1 tablet by mouth 2 times a day.    Yes Historical Provider, MD   simvastatin (Zocor) 40 mg tablet Take 1 tablet (40 mg) by mouth once daily at bedtime.   Yes Historical Provider, MD   torsemide (Demadex) 5 mg tablet Take 1 tablet (5 mg) by mouth once daily.   Yes Historical Provider, MD   warfarin (Coumadin) 2.5 mg tablet Take 1.5 tablets (3.75 mg) by mouth once a day on Monday, Wednesday, and Friday. 2/5/25  yes Historical Provider, MD   warfarin (Coumadin) 2.5 mg tablet Take 1 tablet (2.5 mg) by mouth once a day on Sunday, Tuesday, Thursday, and Saturday. Take as directed per [facility] After Visit Summary. 2/5/25  Yes Historical Provider, MD   levETIRAcetam (Keppra) 750 mg tablet Take 1 tablet (750 mg) by mouth once daily at bedtime.  2/16/25 no Historical Provider, MD        The list below reflectives the updated allergy list. Please review each documented allergy for additional clarification and justification.  Allergies  Reviewed by Eva Brandon RN on 2/15/2025        Severity Reactions Comments    Morphine High Shortness of breath     Adalat Not Specified Unknown, Other hypotension    Empagliflozin Not Specified Other Severe fatigue             Below are additional concerns with the patient's PTA list.    Nathalie Salguero

## 2025-02-17 VITALS
HEIGHT: 64 IN | RESPIRATION RATE: 18 BRPM | BODY MASS INDEX: 25.61 KG/M2 | HEART RATE: 78 BPM | TEMPERATURE: 97.3 F | OXYGEN SATURATION: 92 % | SYSTOLIC BLOOD PRESSURE: 98 MMHG | DIASTOLIC BLOOD PRESSURE: 54 MMHG | WEIGHT: 150 LBS

## 2025-02-17 PROBLEM — I50.23 ACUTE ON CHRONIC SYSTOLIC HEART FAILURE: Status: RESOLVED | Noted: 2025-02-16 | Resolved: 2025-02-17

## 2025-02-17 PROBLEM — R06.02 SHORTNESS OF BREATH: Status: RESOLVED | Noted: 2025-02-16 | Resolved: 2025-02-17

## 2025-02-17 LAB
ANION GAP SERPL CALC-SCNC: 15 MMOL/L (ref 10–20)
ATRIAL RATE: 86 BPM
ATRIAL RATE: 92 BPM
BUN SERPL-MCNC: 30 MG/DL (ref 6–23)
CALCIUM SERPL-MCNC: 9 MG/DL (ref 8.6–10.3)
CHLORIDE SERPL-SCNC: 105 MMOL/L (ref 98–107)
CO2 SERPL-SCNC: 25 MMOL/L (ref 21–32)
CREAT SERPL-MCNC: 1.72 MG/DL (ref 0.5–1.3)
EGFRCR SERPLBLD CKD-EPI 2021: 40 ML/MIN/1.73M*2
ERYTHROCYTE [DISTWIDTH] IN BLOOD BY AUTOMATED COUNT: 13.4 % (ref 11.5–14.5)
GLUCOSE SERPL-MCNC: 138 MG/DL (ref 74–99)
HCT VFR BLD AUTO: 28.8 % (ref 41–52)
HGB BLD-MCNC: 10 G/DL (ref 13.5–17.5)
INR PPP: 2.9 (ref 0.9–1.1)
MCH RBC QN AUTO: 33.3 PG (ref 26–34)
MCHC RBC AUTO-ENTMCNC: 34.7 G/DL (ref 32–36)
MCV RBC AUTO: 96 FL (ref 80–100)
NRBC BLD-RTO: 0 /100 WBCS (ref 0–0)
P AXIS: 62 DEGREES
P AXIS: 66 DEGREES
P OFFSET: 160 MS
P OFFSET: 170 MS
P ONSET: 112 MS
P ONSET: 115 MS
PLATELET # BLD AUTO: 161 X10*3/UL (ref 150–450)
POTASSIUM SERPL-SCNC: 3.7 MMOL/L (ref 3.5–5.3)
PR INTERVAL: 128 MS
PR INTERVAL: 140 MS
PROTHROMBIN TIME: 33.1 SECONDS (ref 9.8–12.8)
Q ONSET: 179 MS
Q ONSET: 182 MS
QRS COUNT: 14 BEATS
QRS COUNT: 15 BEATS
QRS DURATION: 194 MS
QRS DURATION: 198 MS
QT INTERVAL: 438 MS
QT INTERVAL: 454 MS
QTC CALCULATION(BAZETT): 524 MS
QTC CALCULATION(BAZETT): 561 MS
QTC FREDERICIA: 494 MS
QTC FREDERICIA: 523 MS
R AXIS: 190 DEGREES
R AXIS: 207 DEGREES
RBC # BLD AUTO: 3 X10*6/UL (ref 4.5–5.9)
SODIUM SERPL-SCNC: 141 MMOL/L (ref 136–145)
T AXIS: 3 DEGREES
T AXIS: 37 DEGREES
T OFFSET: 401 MS
T OFFSET: 406 MS
VENTRICULAR RATE: 86 BPM
VENTRICULAR RATE: 92 BPM
WBC # BLD AUTO: 6.2 X10*3/UL (ref 4.4–11.3)

## 2025-02-17 PROCEDURE — 2500000001 HC RX 250 WO HCPCS SELF ADMINISTERED DRUGS (ALT 637 FOR MEDICARE OP): Performed by: INTERNAL MEDICINE

## 2025-02-17 PROCEDURE — 80048 BASIC METABOLIC PNL TOTAL CA: CPT | Performed by: INTERNAL MEDICINE

## 2025-02-17 PROCEDURE — 36415 COLL VENOUS BLD VENIPUNCTURE: CPT | Performed by: INTERNAL MEDICINE

## 2025-02-17 PROCEDURE — 85610 PROTHROMBIN TIME: CPT | Performed by: INTERNAL MEDICINE

## 2025-02-17 PROCEDURE — 2500000001 HC RX 250 WO HCPCS SELF ADMINISTERED DRUGS (ALT 637 FOR MEDICARE OP): Performed by: STUDENT IN AN ORGANIZED HEALTH CARE EDUCATION/TRAINING PROGRAM

## 2025-02-17 PROCEDURE — 85027 COMPLETE CBC AUTOMATED: CPT | Performed by: INTERNAL MEDICINE

## 2025-02-17 PROCEDURE — 99239 HOSP IP/OBS DSCHRG MGMT >30: CPT | Performed by: INTERNAL MEDICINE

## 2025-02-17 PROCEDURE — 2500000004 HC RX 250 GENERAL PHARMACY W/ HCPCS (ALT 636 FOR OP/ED): Performed by: STUDENT IN AN ORGANIZED HEALTH CARE EDUCATION/TRAINING PROGRAM

## 2025-02-17 RX ORDER — DAPAGLIFLOZIN 10 MG/1
5 TABLET, FILM COATED ORAL EVERY 24 HOURS
Qty: 15 TABLET | Refills: 0 | Status: SHIPPED | OUTPATIENT
Start: 2025-02-17 | End: 2025-03-19

## 2025-02-17 RX ADMIN — DIGOXIN 125 MCG: 125 TABLET ORAL at 09:30

## 2025-02-17 RX ADMIN — ASPIRIN 81 MG: 81 TABLET, COATED ORAL at 09:30

## 2025-02-17 RX ADMIN — EPLERENONE 25 MG: 25 TABLET, FILM COATED ORAL at 09:30

## 2025-02-17 RX ADMIN — PANTOPRAZOLE SODIUM 40 MG: 40 TABLET, DELAYED RELEASE ORAL at 06:56

## 2025-02-17 RX ADMIN — DAPAGLIFLOZIN 5 MG: 5 TABLET, FILM COATED ORAL at 12:33

## 2025-02-17 RX ADMIN — LEVETIRACETAM 500 MG: 500 TABLET, FILM COATED ORAL at 09:30

## 2025-02-17 RX ADMIN — CARVEDILOL 6.25 MG: 3.12 TABLET, FILM COATED ORAL at 09:30

## 2025-02-17 RX ADMIN — FUROSEMIDE 20 MG: 10 INJECTION, SOLUTION INTRAMUSCULAR; INTRAVENOUS at 09:30

## 2025-02-17 ASSESSMENT — COGNITIVE AND FUNCTIONAL STATUS - GENERAL
MOBILITY SCORE: 24
DAILY ACTIVITIY SCORE: 24

## 2025-02-17 ASSESSMENT — PAIN SCALES - GENERAL: PAINLEVEL_OUTOF10: 0 - NO PAIN

## 2025-02-17 NOTE — CARE PLAN
The patient's goals for the shift include      The clinical goals for the shift include Patient will remain free from shortness of breath.      Problem: Pain - Adult  Goal: Verbalizes/displays adequate comfort level or baseline comfort level  Outcome: Progressing     Problem: Safety - Adult  Goal: Free from fall injury  Outcome: Progressing     Problem: Discharge Planning  Goal: Discharge to home or other facility with appropriate resources  Outcome: Progressing     Problem: Chronic Conditions and Co-morbidities  Goal: Patient's chronic conditions and co-morbidity symptoms are monitored and maintained or improved  Outcome: Progressing     Problem: Nutrition  Goal: Nutrient intake appropriate for maintaining nutritional needs  Outcome: Progressing     Problem: Respiratory  Goal: Clear secretions with interventions this shift  Outcome: Progressing  Goal: Minimize anxiety/maximize coping throughout shift  Outcome: Progressing  Goal: Minimal/no exertional discomfort or dyspnea this shift  Outcome: Progressing  Goal: No signs of respiratory distress (eg. Use of accessory muscles. Peds grunting)  Outcome: Progressing  Goal: Patent airway maintained this shift  Outcome: Progressing  Goal: Tolerate pulmonary toileting this shift  Outcome: Progressing  Goal: Verbalize decreased shortness of breath this shift  Outcome: Progressing  Goal: Wean oxygen to maintain O2 saturation per order/standard this shift  Outcome: Progressing  Goal: Increase self care and/or family involvement in next 24 hours  Outcome: Progressing

## 2025-02-17 NOTE — PROGRESS NOTES
02/17/25 1114   Discharge Planning   Living Arrangements Spouse/significant other   Support Systems Spouse/significant other   Type of Residence Private residence   Who is requesting discharge planning? Provider   Expected Discharge Disposition Home   Stroke Family Assessment   Stroke Family Assessment Needed No   Intensity of Service   Intensity of Service 0-30 min     Met with pt, pt admit for CHF.  Pt is ambulatory, walking in halls.  Pt denies any needs, denies hhc, pt would benefit from cardiac rehab.   Home address,  insurance and PCP  Dr Lantigua  at [facility].   Esther Velez RN [facility]

## 2025-02-17 NOTE — DISCHARGE SUMMARY
Discharge Diagnosis  Acute on chronic systolic heart failure    Issues Requiring Follow-Up  Close follow up with PCP and cardiology.    Discharge Meds     Medication List      ASK your doctor about these medications     acetaminophen 650 mg ER tablet; Commonly known as: Tylenol 8 HOUR   aspirin 81 mg EC tablet   carvedilol 6.25 mg tablet; Commonly known as: Coreg   digoxin 125 MCG tablet; Commonly known as: Lanoxin   Entresto 24-26 mg tablet; Generic drug: sacubitriL-valsartan   eplerenone 25 mg tablet; Commonly known as: Inspra   finasteride 5 mg tablet; Commonly known as: Proscar   * levETIRAcetam 500 mg tablet; Commonly known as: Keppra; Ask about:   Which instructions should I use?   * levETIRAcetam 500 mg tablet; Commonly known as: Keppra; Ask about:   Which instructions should I use?   omeprazole 20 mg DR capsule; Commonly known as: PriLOSEC   simvastatin 40 mg tablet; Commonly known as: Zocor   torsemide 5 mg tablet; Commonly known as: Demadex   Trulicity 1.5 mg/0.5 mL pen injector injection; Generic drug:   dulaglutide   * warfarin 2.5 mg tablet; Commonly known as: Coumadin   * warfarin 2.5 mg tablet; Commonly known as: Coumadin  * This list has 4 medication(s) that are the same as other medications   prescribed for you. Read the directions carefully, and ask your doctor or   other care provider to review them with you.       Test Results Pending At Discharge  Pending Labs       No current pending labs.            Hospital Course   Ernst Clark is a 79 y.o. male PMHx HTN, HLD, dilated cardiomyopathy, systolic heart failure EF 15-25% s/p CRT-D, SSS s/p pacemaker, PAF on coumadin, CKD 3a, seizure disorder, DM2, stage1 NSCLC of RUL s/p radiation presented to [facility] ED for shortness of breath.  Admitted for acute on chronic HFrEF.  Patient did not require supplemental oxygen, and lower extremity edema was completely resolved prior to dc home.  Increased IV lasix used during admission.  Patient cleared by  cardiology for discharge. Farxiga added to patient's medication regimen.        Pertinent Physical Exam At Time of Discharge  Physical Exam  Vitals reviewed.   Constitutional:       Appearance: Normal appearance.   HENT:      Head: Normocephalic and atraumatic.      Mouth/Throat:      Mouth: Mucous membranes are moist.   Eyes:      Conjunctiva/sclera: Conjunctivae normal.   Cardiovascular:      Rate and Rhythm: Normal rate.      Pulses: Normal pulses.      Comments: No LE edema  Pulmonary:      Effort: Pulmonary effort is normal.      Breath sounds: Normal breath sounds. No wheezing.   Abdominal:      General: Abdomen is flat. There is no distension.      Palpations: Abdomen is soft.      Tenderness: There is no guarding.   Musculoskeletal:         General: No swelling. Normal range of motion.   Skin:     General: Skin is warm and dry.   Neurological:      General: No focal deficit present.      Mental Status: He is alert. Mental status is at baseline.   Psychiatric:         Mood and Affect: Mood normal.         Behavior: Behavior normal.         Outpatient Follow-Up  No future appointments.      Greg Byrnes MD

## 2025-02-17 NOTE — NURSING NOTE
Met with patient and wife at the bedside. Discharge Planning discussed. AVS updated with follow-ups, education, and medication information. Verified/ entered a PCP and pharmacy. New medications and side effects discussed. Discussed new medication and price at length. Patient will follow up with own Cardiologist.  All questions answered.  Updated nurse on this info. AVS printed and hi-lighted. IV and tele removed.

## 2025-02-17 NOTE — CARE PLAN
The patient's goals for the shift include      The clinical goals for the shift include      Over the shift, the patient did not make progress toward the following goals. Barriers to progression include Pt has recent altered oxygen needs. Recommendations to address these barriers include Maintain stable vitals; Maintain safety.

## 2025-02-28 LAB
ATRIAL RATE: 86 BPM
ATRIAL RATE: 92 BPM
P AXIS: 62 DEGREES
P AXIS: 66 DEGREES
P OFFSET: 160 MS
P OFFSET: 170 MS
P ONSET: 112 MS
P ONSET: 115 MS
PR INTERVAL: 128 MS
PR INTERVAL: 140 MS
Q ONSET: 179 MS
Q ONSET: 182 MS
QRS COUNT: 14 BEATS
QRS COUNT: 15 BEATS
QRS DURATION: 194 MS
QRS DURATION: 198 MS
QT INTERVAL: 438 MS
QT INTERVAL: 454 MS
QTC CALCULATION(BAZETT): 524 MS
QTC CALCULATION(BAZETT): 561 MS
QTC FREDERICIA: 494 MS
QTC FREDERICIA: 523 MS
R AXIS: 190 DEGREES
R AXIS: 207 DEGREES
T AXIS: 3 DEGREES
T AXIS: 37 DEGREES
T OFFSET: 401 MS
T OFFSET: 406 MS
VENTRICULAR RATE: 86 BPM
VENTRICULAR RATE: 92 BPM